# Patient Record
Sex: MALE | Race: WHITE | NOT HISPANIC OR LATINO | Employment: OTHER | URBAN - METROPOLITAN AREA
[De-identification: names, ages, dates, MRNs, and addresses within clinical notes are randomized per-mention and may not be internally consistent; named-entity substitution may affect disease eponyms.]

---

## 2019-01-10 ENCOUNTER — OFFICE VISIT - HEALTHEAST (OUTPATIENT)
Dept: CARDIOLOGY | Facility: CLINIC | Age: 74
End: 2019-01-10

## 2019-01-10 DIAGNOSIS — I25.10 CORONARY ARTERY DISEASE INVOLVING NATIVE CORONARY ARTERY OF NATIVE HEART WITHOUT ANGINA PECTORIS: ICD-10-CM

## 2019-01-10 ASSESSMENT — MIFFLIN-ST. JEOR: SCORE: 1642.77

## 2019-05-20 ENCOUNTER — OFFICE VISIT - HEALTHEAST (OUTPATIENT)
Dept: FAMILY MEDICINE | Facility: CLINIC | Age: 74
End: 2019-05-20

## 2019-05-20 DIAGNOSIS — S61.551A DOG BITE OF RIGHT WRIST WITH INFECTION, INITIAL ENCOUNTER: ICD-10-CM

## 2019-05-20 DIAGNOSIS — L08.9 DOG BITE OF RIGHT WRIST WITH INFECTION, INITIAL ENCOUNTER: ICD-10-CM
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
## 2019-05-23 ENCOUNTER — OFFICE VISIT - HEALTHEAST (OUTPATIENT)
Dept: INTERNAL MEDICINE | Facility: CLINIC | Age: 74
End: 2019-05-23

## 2019-05-23 DIAGNOSIS — J43.9 PULMONARY EMPHYSEMA, UNSPECIFIED EMPHYSEMA TYPE (H): ICD-10-CM

## 2019-05-23 DIAGNOSIS — Z86.002 HX OF CARCINOMA IN SITU OF PROSTATE: ICD-10-CM
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
## 2019-05-23 DIAGNOSIS — I25.10 CORONARY ARTERY DISEASE DUE TO CALCIFIED CORONARY LESION: ICD-10-CM

## 2019-05-23 DIAGNOSIS — I10 BENIGN ESSENTIAL HYPERTENSION: ICD-10-CM

## 2019-05-23 DIAGNOSIS — I25.84 CORONARY ARTERY DISEASE DUE TO CALCIFIED CORONARY LESION: ICD-10-CM

## 2019-05-23 DIAGNOSIS — F41.9 ANXIETY: ICD-10-CM

## 2019-05-23 ASSESSMENT — MIFFLIN-ST. JEOR: SCORE: 1606.49

## 2019-05-30 ENCOUNTER — AMBULATORY - HEALTHEAST (OUTPATIENT)
Dept: LAB | Facility: CLINIC | Age: 74
End: 2019-05-30

## 2019-05-30 DIAGNOSIS — Z86.002 HX OF CARCINOMA IN SITU OF PROSTATE: ICD-10-CM

## 2019-05-30 DIAGNOSIS — I10 BENIGN ESSENTIAL HYPERTENSION: ICD-10-CM

## 2019-05-30 DIAGNOSIS — I25.84 CORONARY ARTERY DISEASE DUE TO CALCIFIED CORONARY LESION: ICD-10-CM

## 2019-05-30 DIAGNOSIS — I25.10 CORONARY ARTERY DISEASE DUE TO CALCIFIED CORONARY LESION: ICD-10-CM

## 2019-05-30 LAB
ANION GAP SERPL CALCULATED.3IONS-SCNC: 9 MMOL/L (ref 5–18)
BUN SERPL-MCNC: 13 MG/DL (ref 8–28)
CALCIUM SERPL-MCNC: 9.4 MG/DL (ref 8.5–10.5)
CHLORIDE BLD-SCNC: 104 MMOL/L (ref 98–107)
CHOLEST SERPL-MCNC: 155 MG/DL
CO2 SERPL-SCNC: 25 MMOL/L (ref 22–31)
CREAT SERPL-MCNC: 0.83 MG/DL (ref 0.7–1.3)
ERYTHROCYTE [DISTWIDTH] IN BLOOD BY AUTOMATED COUNT: 12 % (ref 11–14.5)
FASTING STATUS PATIENT QL REPORTED: YES
GFR SERPL CREATININE-BSD FRML MDRD: >60 ML/MIN/1.73M2
GLUCOSE BLD-MCNC: 95 MG/DL (ref 70–125)
HCT VFR BLD AUTO: 49.4 % (ref 40–54)
HDLC SERPL-MCNC: 50 MG/DL
HGB BLD-MCNC: 17.1 G/DL (ref 14–18)
LDLC SERPL CALC-MCNC: 92 MG/DL
MCH RBC QN AUTO: 32.4 PG (ref 27–34)
MCHC RBC AUTO-ENTMCNC: 34.6 G/DL (ref 32–36)
MCV RBC AUTO: 94 FL (ref 80–100)
PLATELET # BLD AUTO: 283 THOU/UL (ref 140–440)
PMV BLD AUTO: 9.2 FL (ref 8.5–12.5)
POTASSIUM BLD-SCNC: 4.6 MMOL/L (ref 3.5–5)
PSA SERPL-MCNC: <0.1 NG/ML (ref 0–6.5)
RBC # BLD AUTO: 5.27 MILL/UL (ref 4.4–6.2)
SODIUM SERPL-SCNC: 138 MMOL/L (ref 136–145)
TRIGL SERPL-MCNC: 67 MG/DL
TSH SERPL DL<=0.005 MIU/L-ACNC: 1.33 UIU/ML (ref 0.3–5)
WBC: 7.2 THOU/UL (ref 4–11)

## 2019-06-07 ENCOUNTER — COMMUNICATION - HEALTHEAST (OUTPATIENT)
Dept: INTERNAL MEDICINE | Facility: CLINIC | Age: 74
End: 2019-06-07

## 2019-06-14 ENCOUNTER — COMMUNICATION - HEALTHEAST (OUTPATIENT)
Dept: INTERNAL MEDICINE | Facility: CLINIC | Age: 74
End: 2019-06-14

## 2019-06-14 DIAGNOSIS — J43.9 PULMONARY EMPHYSEMA, UNSPECIFIED EMPHYSEMA TYPE (H): ICD-10-CM

## 2019-10-03 ENCOUNTER — HEALTH MAINTENANCE LETTER (OUTPATIENT)
Age: 74
End: 2019-10-03

## 2019-10-28 ENCOUNTER — OFFICE VISIT (OUTPATIENT)
Dept: FAMILY MEDICINE | Facility: CLINIC | Age: 74
End: 2019-10-28
Payer: COMMERCIAL

## 2019-10-28 VITALS
RESPIRATION RATE: 24 BRPM | OXYGEN SATURATION: 93 % | WEIGHT: 186 LBS | HEIGHT: 73 IN | TEMPERATURE: 98.3 F | SYSTOLIC BLOOD PRESSURE: 118 MMHG | BODY MASS INDEX: 24.65 KG/M2 | HEART RATE: 90 BPM | DIASTOLIC BLOOD PRESSURE: 70 MMHG

## 2019-10-28 DIAGNOSIS — Z12.11 SPECIAL SCREENING FOR MALIGNANT NEOPLASMS, COLON: ICD-10-CM

## 2019-10-28 DIAGNOSIS — H61.23 CERUMINOSIS, BILATERAL: ICD-10-CM

## 2019-10-28 DIAGNOSIS — Z00.00 ENCOUNTER FOR MEDICARE ANNUAL WELLNESS EXAM: Primary | ICD-10-CM

## 2019-10-28 PROCEDURE — 99387 INIT PM E/M NEW PAT 65+ YRS: CPT | Performed by: FAMILY MEDICINE

## 2019-10-28 PROCEDURE — 99212 OFFICE O/P EST SF 10 MIN: CPT | Mod: 25 | Performed by: FAMILY MEDICINE

## 2019-10-28 RX ORDER — ATORVASTATIN CALCIUM 40 MG/1
40 TABLET, FILM COATED ORAL
COMMUNITY
Start: 2018-12-14 | End: 2019-10-28

## 2019-10-28 RX ORDER — MIRTAZAPINE 30 MG/1
30 TABLET, FILM COATED ORAL
COMMUNITY
Start: 2018-12-12 | End: 2021-05-24

## 2019-10-28 RX ORDER — AMLODIPINE BESYLATE 5 MG/1
5 TABLET ORAL
COMMUNITY
Start: 2018-12-21

## 2019-10-28 RX ORDER — ASPIRIN 81 MG/1
81 TABLET ORAL DAILY
COMMUNITY
Start: 2019-01-10

## 2019-10-28 RX ORDER — ALPRAZOLAM 0.5 MG
TABLET ORAL
COMMUNITY
Start: 2019-05-23 | End: 2020-05-11

## 2019-10-28 RX ORDER — ALBUTEROL SULFATE 90 UG/1
1-2 AEROSOL, METERED RESPIRATORY (INHALATION)
COMMUNITY
Start: 2018-03-15

## 2019-10-28 RX ORDER — BUDESONIDE AND FORMOTEROL FUMARATE DIHYDRATE 80; 4.5 UG/1; UG/1
1 AEROSOL RESPIRATORY (INHALATION)
COMMUNITY
Start: 2018-03-15

## 2019-10-28 ASSESSMENT — ENCOUNTER SYMPTOMS
DIARRHEA: 0
CHILLS: 0
EYE PAIN: 0
DIZZINESS: 0
NERVOUS/ANXIOUS: 1
COUGH: 1
CONSTIPATION: 0
HEMATURIA: 0
ABDOMINAL PAIN: 0
HEMATOCHEZIA: 0
FEVER: 0

## 2019-10-28 ASSESSMENT — MIFFLIN-ST. JEOR: SCORE: 1642.57

## 2019-10-28 ASSESSMENT — ACTIVITIES OF DAILY LIVING (ADL): CURRENT_FUNCTION: NO ASSISTANCE NEEDED

## 2019-10-28 NOTE — PROGRESS NOTES
"SUBJECTIVE:   Omar Adams is a 74 year old male who presents for Preventive Visit.  Are you in the first 12 months of your Medicare coverage?  No    Healthy Habits:     In general, how would you rate your overall health?  Good    Frequency of exercise:  2-3 days/week    Duration of exercise:  30-45 minutes    Do you usually eat at least 4 servings of fruit and vegetables a day, include whole grains    & fiber and avoid regularly eating high fat or \"junk\" foods?  Yes    Taking medications regularly:  Yes    Medication side effects:  None    Ability to successfully perform activities of daily living:  No assistance needed    Home Safety:  No safety concerns identified    Hearing Impairment:  Difficulty following a conversation in a noisy restaurant or crowded room, need to ask people to speak up or repeat themselves and difficulty understanding soft or whispered speech    In the past 6 months, have you been bothered by leaking of urine?  No    In general, how would you rate your overall mental or emotional health?  Good      PHQ-2 Total Score: 2    Additional concerns today:  No    Current Chronic Medical Conditions  Hyperlipidemia- went off meds last year  HTN  COPD   RLS  Chronic low back pain and hx of fractures    Social History  Lives alone.  .  Three adult kids: 38 son, 35 daughter, 39 son.  5 grand kids.  Retired  from [employer] since 2015.  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 November- April.     HCM  Over due for colonoscopy.    Additional Concerns  Recent COPD exacerbation- s/p recent prednisone for flare.  Ears are clogged with wax B    Do you feel safe in your environment? Yes    Do you have a Health Care Directive? No: Advance care planning reviewed with patient; information given to patient to review.      Fall risk  Fallen 2 or more times in the past year?: No  Any fall with injury in the past year?: No    Cognitive " Screening   1) Repeat 3 items (Leader, Season, Table)    2) Clock draw: NORMAL  3) 3 item recall: Recalls 1 object   Results: NORMAL clock, 1-2 items recalled: COGNITIVE IMPAIRMENT LESS LIKELY    Mini-CogTM Copyright ROBERTO Myers. Licensed by the author for use in [facility]; reprinted with permission (yadira@[facility]). All rights reserved.      Do you have sleep apnea, excessive snoring or daytime drowsiness?: no    Reviewed and updated as needed this visit by clinical staff  Tobacco  Allergies  Meds  Med Hx  Surg Hx  Fam Hx  Soc Hx        Reviewed and updated as needed this visit by Provider        Social History     Tobacco Use     Smoking status: Current Some Day Smoker     Packs/day: 0.00     Types: Cigarettes     Smokeless tobacco: Never Used   Substance Use Topics     Alcohol use: Not Currently     If you drink alcohol do you typically have >3 drinks per day or >7 drinks per week? No    Alcohol Use 10/28/2019   Prescreen: >3 drinks/day or >7 drinks/week? No       Current providers sharing in care for this patient include: Patient Care Team:  Syeda Puga MD as PCP - General (Family Practice)    The following health maintenance items are reviewed in Epic and correct as of today:  Health Maintenance   Topic Date Due     HEPATITIS C SCREENING  1945     ADVANCE CARE PLANNING  1945     COLONOSCOPY  06/01/1956     LIPID  06/01/1981     ZOSTER IMMUNIZATION (1 of 2) 06/01/1996     MEDICARE ANNUAL WELLNESS VISIT  06/01/2011     FALL RISK ASSESSMENT  06/01/2011     AORTIC ANEURYSM SCREENING (SYSTEM ASSIGNED)  06/01/2011     PHQ-2  01/01/2019     INFLUENZA VACCINE (1) 09/01/2019     DTAP/TDAP/TD IMMUNIZATION (3 - Td) 05/20/2029     PNEUMOCOCCAL IMMUNIZATION 65+ LOW/MEDIUM RISK  Completed     IPV IMMUNIZATION  Aged Out     MENINGITIS IMMUNIZATION  Aged Out     BP Readings from Last 3 Encounters:   10/28/19 118/70    Wt Readings from Last 3 Encounters:   10/28/19 84.4 kg (186  "lb)                  There is no problem list on file for this patient.    History reviewed. No pertinent surgical history.    Social History     Tobacco Use     Smoking status: Current Some Day Smoker     Packs/day: 0.00     Types: Cigarettes     Smokeless tobacco: Never Used   Substance Use Topics     Alcohol use: Not Currently     History reviewed. No pertinent family history.      Current Outpatient Medications   Medication Sig Dispense Refill     albuterol (PROAIR HFA/PROVENTIL HFA/VENTOLIN HFA) 108 (90 Base) MCG/ACT inhaler Inhale 1-2 puffs into the lungs       ALPRAZolam (XANAX) 0.5 MG tablet TAKE ONE TABLET BY MOUTH AT BEDTIME AS NEEDED       amLODIPine (NORVASC) 5 MG tablet Take 5 mg by mouth       aspirin 81 MG EC tablet Take 81 mg by mouth daily       budesonide-formoterol (SYMBICORT) 80-4.5 MCG/ACT Inhaler Inhale 1 puff into the lungs       medical cannabis (Patient's own supply) every 24 hours       mirtazapine (REMERON) 30 MG tablet Take 30 mg by mouth       No Known Allergies  No lab results found.       Review of Systems   Constitutional: Negative for chills and fever.   HENT: Positive for congestion. Negative for ear pain.    Eyes: Negative for pain.   Respiratory: Positive for cough.    Cardiovascular: Negative for chest pain.   Gastrointestinal: Negative for abdominal pain, constipation, diarrhea and hematochezia.   Genitourinary: Negative for hematuria.   Neurological: Negative for dizziness.   Psychiatric/Behavioral: The patient is nervous/anxious.          OBJECTIVE:   /70   Pulse 90   Temp 98.3  F (36.8  C) (Oral)   Resp 24   Ht 1.854 m (6' 1\")   Wt 84.4 kg (186 lb)   SpO2 93%   BMI 24.54 kg/m   Estimated body mass index is 24.54 kg/m  as calculated from the following:    Height as of this encounter: 1.854 m (6' 1\").    Weight as of this encounter: 84.4 kg (186 lb).  Physical Exam  GENERAL: healthy, alert and no distress  EYES: Eyes grossly normal to inspection, PERRL and " "conjunctivae and sclerae normal  HENT: B cerumenosis unable to remove with curettage, nose and mouth without ulcers or lesions  NECK: no adenopathy, no asymmetry, masses, or scars and thyroid normal to palpation  RESP: lungs clear to auscultation - no rales, rhonchi or wheezes  CV: regular rate and rhythm, normal S1 S2, no S3 or S4, no murmur, click or rub, no peripheral edema and peripheral pulses strong  ABDOMEN: soft, nontender, no hepatosplenomegaly, no masses and bowel sounds normal  MS: no gross musculoskeletal defects noted, no edema  SKIN: no suspicious lesions or rashes  NEURO: Normal strength and tone, mentation intact and speech normal  PSYCH: mentation appears normal, affect normal/bright    ASSESSMENT / PLAN:   1. Encounter for Medicare annual wellness exam    - GASTROENTEROLOGY ADULT REF PROCEDURE ONLY None    Labs recently drawn 5-2019-- will repeat next May.  Continue good diet and exercise.    2. Special screening for malignant neoplasms, colon    - GASTROENTEROLOGY ADULT REF PROCEDURE ONLY None    Referred to [facility] for colonoscopy- patient to call to schedule.    3. Ceruminosis, bilateral    - REMOVE IMPACTED CERUMEN    Resolved s/p B lavage.    End of Life Planning:  Patient currently has an advanced directive: No.  I have verified the patient's ablity to prepare an advanced directive/make health care decisions.  Literature was provided to assist patient in preparing an advanced directive.    COUNSELING:  Reviewed preventive health counseling, as reflected in patient instructions       Regular exercise       Healthy diet/nutrition       Colon cancer screening    Estimated body mass index is 24.54 kg/m  as calculated from the following:    Height as of this encounter: 1.854 m (6' 1\").    Weight as of this encounter: 84.4 kg (186 lb).         reports that he has been smoking cigarettes. He has been smoking about 0.00 packs per day. He has never used smokeless tobacco.  Tobacco Cessation Action Plan: " Information offered: Patient not interested at this time    Appropriate preventive services were discussed with this patient, including applicable screening as appropriate for cardiovascular disease, diabetes, osteopenia/osteoporosis, and glaucoma.  As appropriate for age/gender, discussed screening for colorectal cancer, prostate cancer, breast cancer, and cervical cancer. Checklist reviewing preventive services available has been given to the patient.    Reviewed patients plan of care and provided an AVS. The Basic Care Plan (routine screening as documented in Health Maintenance) for Omar meets the Care Plan requirement. This Care Plan has been established and reviewed with the Patient.    Counseling Resources:  ATP IV Guidelines  Pooled Cohorts Equation Calculator  Breast Cancer Risk Calculator  FRAX Risk Assessment  ICSI Preventive Guidelines  Dietary Guidelines for Americans, 2010  USDA's MyPlate  ASA Prophylaxis  Lung CA Screening    Syeda Puga MD  [facility]    Identified Health Risks:

## 2019-10-28 NOTE — PATIENT INSTRUCTIONS
Patient Education   Personalized Prevention Plan  You are due for the preventive services outlined below.  Your care team is available to assist you in scheduling these services.  If you have already completed any of these items, please share that information with your care team to update in your medical record.  Health Maintenance Due   Topic Date Due     Hepatitis C Screening  1945     Discuss Advance Care Planning  1945     Colonoscopy  06/01/1956     Cholesterol Lab  06/01/1981     Zoster (Shingles) Vaccine (1 of 2) 06/01/1996     Annual Wellness Visit  06/01/2011     FALL RISK ASSESSMENT  06/01/2011     AORTIC ANEURYSM SCREENING (SYSTEM ASSIGNED)  06/01/2011     PHQ-2  01/01/2019     Flu Vaccine (1) 09/01/2019

## 2019-12-24 ENCOUNTER — TRANSFERRED RECORDS (OUTPATIENT)
Dept: HEALTH INFORMATION MANAGEMENT | Facility: CLINIC | Age: 74
End: 2019-12-24

## 2019-12-24 ENCOUNTER — RECORDS - HEALTHEAST (OUTPATIENT)
Dept: ADMINISTRATIVE | Facility: OTHER | Age: 74
End: 2019-12-24

## 2020-03-16 ENCOUNTER — E-VISIT (OUTPATIENT)
Dept: FAMILY MEDICINE | Facility: CLINIC | Age: 75
End: 2020-03-16

## 2020-03-16 ENCOUNTER — TELEPHONE (OUTPATIENT)
Dept: FAMILY MEDICINE | Facility: CLINIC | Age: 75
End: 2020-03-16

## 2020-03-16 DIAGNOSIS — G89.29 CHRONIC LOW BACK PAIN WITH SCIATICA, SCIATICA LATERALITY UNSPECIFIED, UNSPECIFIED BACK PAIN LATERALITY: Primary | ICD-10-CM

## 2020-03-16 DIAGNOSIS — M54.40 CHRONIC LOW BACK PAIN WITH SCIATICA, SCIATICA LATERALITY UNSPECIFIED, UNSPECIFIED BACK PAIN LATERALITY: Primary | ICD-10-CM

## 2020-03-16 PROCEDURE — 99421 OL DIG E/M SVC 5-10 MIN: CPT | Performed by: FAMILY MEDICINE

## 2020-03-16 RX ORDER — OXYCODONE AND ACETAMINOPHEN 5; 325 MG/1; MG/1
1 TABLET ORAL EVERY 6 HOURS PRN
Qty: 10 TABLET | Refills: 0 | Status: SHIPPED | OUTPATIENT
Start: 2020-03-16 | End: 2020-03-19

## 2020-03-16 NOTE — TELEPHONE ENCOUNTER
"S-(situation): Pt arrived from [facility] one week ago.Sciatica developed while he was down there.  His neighbor in [facility], who is a [place] doctor ,gave him  6-8 injections every other day, and the sciatica  pain went away. Pain from hip to knee then and only felt it when he was laying down to sleep.     Now he thinks he bruised his upper rib on the left side from sleeping on that side as he was avoiding sleeping on the other side. He was only sleeping on that one side for days.His main concern is a  \"bruised rib.\"    He also states the air quality was bad in [facility] and had COPD exacerbation which his improved.  In the past he had pleurisy and worried he could get this.     He is asking for pain medication to get through the night due to rib pain.     Cough is resolving  but when he does cough it is productive. The coughing was making the rib worse but no pain now with cough just with laying on side or touching area.    He is taking ibuprofen and tylenol for this rib pain with no help    B-(background): no injury    Ok for E visit? Or phone visit?    Sheela Proctor, RN, BSN       "

## 2020-05-08 ENCOUNTER — E-VISIT (OUTPATIENT)
Dept: FAMILY MEDICINE | Facility: CLINIC | Age: 75
End: 2020-05-08

## 2020-05-08 DIAGNOSIS — F41.1 GAD (GENERALIZED ANXIETY DISORDER): Primary | ICD-10-CM

## 2020-05-08 PROCEDURE — 99207 ZZC NO BILLABLE SERVICE THIS VISIT: CPT | Performed by: FAMILY MEDICINE

## 2020-05-08 ASSESSMENT — ANXIETY QUESTIONNAIRES
GAD7 TOTAL SCORE: 4
6. BECOMING EASILY ANNOYED OR IRRITABLE: NOT AT ALL
7. FEELING AFRAID AS IF SOMETHING AWFUL MIGHT HAPPEN: NOT AT ALL
4. TROUBLE RELAXING: SEVERAL DAYS
1. FEELING NERVOUS, ANXIOUS, OR ON EDGE: SEVERAL DAYS
5. BEING SO RESTLESS THAT IT IS HARD TO SIT STILL: NOT AT ALL
3. WORRYING TOO MUCH ABOUT DIFFERENT THINGS: SEVERAL DAYS
GAD7 TOTAL SCORE: 4
2. NOT BEING ABLE TO STOP OR CONTROL WORRYING: SEVERAL DAYS
7. FEELING AFRAID AS IF SOMETHING AWFUL MIGHT HAPPEN: NOT AT ALL

## 2020-05-09 ASSESSMENT — ANXIETY QUESTIONNAIRES: GAD7 TOTAL SCORE: 4

## 2020-05-11 RX ORDER — ALPRAZOLAM 0.5 MG
0.5 TABLET ORAL
Qty: 30 TABLET | Refills: 2 | Status: SHIPPED | OUTPATIENT
Start: 2020-05-11

## 2020-07-24 ENCOUNTER — COMMUNICATION - HEALTHEAST (OUTPATIENT)
Dept: INTERNAL MEDICINE | Facility: CLINIC | Age: 75
End: 2020-07-24

## 2020-07-24 DIAGNOSIS — I10 BENIGN ESSENTIAL HYPERTENSION: ICD-10-CM

## 2020-09-28 ASSESSMENT — MIFFLIN-ST. JEOR: SCORE: 1662.96

## 2020-09-29 ENCOUNTER — COMMUNICATION - HEALTHEAST (OUTPATIENT)
Dept: SCHEDULING | Facility: CLINIC | Age: 75
End: 2020-09-29

## 2020-09-29 ENCOUNTER — ANESTHESIA - HEALTHEAST (OUTPATIENT)
Dept: SURGERY | Facility: CLINIC | Age: 75
End: 2020-09-29

## 2020-09-29 ENCOUNTER — SURGERY - HEALTHEAST (OUTPATIENT)
Dept: SURGERY | Facility: CLINIC | Age: 75
End: 2020-09-29

## 2020-09-30 ENCOUNTER — HOME CARE/HOSPICE - HEALTHEAST (OUTPATIENT)
Dept: HOME HEALTH SERVICES | Facility: HOME HEALTH | Age: 75
End: 2020-09-30

## 2020-09-30 ENCOUNTER — COMMUNICATION - HEALTHEAST (OUTPATIENT)
Dept: INTERNAL MEDICINE | Facility: CLINIC | Age: 75
End: 2020-09-30

## 2020-10-05 ENCOUNTER — RECORDS - HEALTHEAST (OUTPATIENT)
Dept: ADMINISTRATIVE | Facility: OTHER | Age: 75
End: 2020-10-05

## 2020-10-06 ENCOUNTER — RECORDS - HEALTHEAST (OUTPATIENT)
Dept: ADMINISTRATIVE | Facility: OTHER | Age: 75
End: 2020-10-06

## 2020-10-07 ENCOUNTER — HOSPITAL ENCOUNTER (OUTPATIENT)
Dept: ULTRASOUND IMAGING | Facility: CLINIC | Age: 75
Discharge: HOME | End: 2020-10-07
Attending: ORTHOPAEDIC SURGERY

## 2020-10-07 DIAGNOSIS — M79.602 PAIN AND SWELLING OF LEFT UPPER EXTREMITY: ICD-10-CM

## 2020-10-07 DIAGNOSIS — M79.89 PAIN AND SWELLING OF LEFT UPPER EXTREMITY: ICD-10-CM

## 2020-10-08 ENCOUNTER — COMMUNICATION - HEALTHEAST (OUTPATIENT)
Dept: INTERNAL MEDICINE | Facility: CLINIC | Age: 75
End: 2020-10-08

## 2020-10-08 ENCOUNTER — OFFICE VISIT - HEALTHEAST (OUTPATIENT)
Dept: INTERNAL MEDICINE | Facility: CLINIC | Age: 75
End: 2020-10-08

## 2020-10-08 ENCOUNTER — TELEPHONE (OUTPATIENT)
Dept: FAMILY MEDICINE | Facility: CLINIC | Age: 75
End: 2020-10-08

## 2020-10-08 DIAGNOSIS — S42.292A OTHER CLOSED DISPLACED FRACTURE OF PROXIMAL END OF LEFT HUMERUS, INITIAL ENCOUNTER: ICD-10-CM

## 2020-10-08 DIAGNOSIS — J44.9 CHRONIC OBSTRUCTIVE PULMONARY DISEASE, UNSPECIFIED COPD TYPE (H): ICD-10-CM

## 2020-10-08 DIAGNOSIS — F41.9 ANXIETY: ICD-10-CM

## 2020-10-08 ASSESSMENT — MIFFLIN-ST. JEOR: SCORE: 1666.58

## 2020-10-08 ASSESSMENT — PATIENT HEALTH QUESTIONNAIRE - PHQ9: SUM OF ALL RESPONSES TO PHQ QUESTIONS 1-9: 0

## 2020-10-08 NOTE — TELEPHONE ENCOUNTER
LM to [facility]-He does have an internal medicine appointment today. Juhi Lin RN, MD   Internal Medicine   NPI: [id number]   [address] [address]       Phone: [phone]   Fax: [phone]

## 2020-10-08 NOTE — TELEPHONE ENCOUNTER
Reason for Call:  Other call back    Detailed comments: Patient thought he had an appointment scheduled with PCP today. States he fell and broke his arm last week and was called by 'her nurse' about this and supposedly relayed to the patient that he had an appointment. Nothing scheduled. He is requesting a call back to discuss this / medication. Please follow up. Thanks!    Phone Number Patient can be reached at: Home number on file [phone] (home)    Best Time: Any    Can we leave a detailed message on this number? YES    Call taken on 10/8/2020 at 10:33 AM by Lindsay Castillo

## 2020-10-09 ENCOUNTER — RECORDS - HEALTHEAST (OUTPATIENT)
Dept: ADMINISTRATIVE | Facility: OTHER | Age: 75
End: 2020-10-09

## 2020-10-14 ENCOUNTER — COMMUNICATION - HEALTHEAST (OUTPATIENT)
Dept: INTERNAL MEDICINE | Facility: CLINIC | Age: 75
End: 2020-10-14

## 2020-10-16 ENCOUNTER — RECORDS - HEALTHEAST (OUTPATIENT)
Dept: ADMINISTRATIVE | Facility: OTHER | Age: 75
End: 2020-10-16

## 2020-10-22 ENCOUNTER — COMMUNICATION - HEALTHEAST (OUTPATIENT)
Dept: INTERNAL MEDICINE | Facility: CLINIC | Age: 75
End: 2020-10-22

## 2020-10-28 ENCOUNTER — RECORDS - HEALTHEAST (OUTPATIENT)
Dept: ADMINISTRATIVE | Facility: OTHER | Age: 75
End: 2020-10-28

## 2020-10-29 ENCOUNTER — RECORDS - HEALTHEAST (OUTPATIENT)
Dept: ADMINISTRATIVE | Facility: OTHER | Age: 75
End: 2020-10-29

## 2020-11-03 ENCOUNTER — RECORDS - HEALTHEAST (OUTPATIENT)
Dept: ADMINISTRATIVE | Facility: OTHER | Age: 75
End: 2020-11-03

## 2020-11-07 ENCOUNTER — HEALTH MAINTENANCE LETTER (OUTPATIENT)
Age: 75
End: 2020-11-07

## 2020-11-12 ENCOUNTER — RECORDS - HEALTHEAST (OUTPATIENT)
Dept: ADMINISTRATIVE | Facility: OTHER | Age: 75
End: 2020-11-12

## 2020-11-16 ENCOUNTER — OFFICE VISIT - HEALTHEAST (OUTPATIENT)
Dept: INTERNAL MEDICINE | Facility: CLINIC | Age: 75
End: 2020-11-16

## 2020-11-16 DIAGNOSIS — F41.9 ANXIETY: ICD-10-CM

## 2020-11-16 DIAGNOSIS — F33.1 MAJOR DEPRESSIVE DISORDER, RECURRENT EPISODE, MODERATE (H): ICD-10-CM

## 2020-11-16 DIAGNOSIS — Z00.00 WELLNESS EXAMINATION: ICD-10-CM

## 2020-11-16 DIAGNOSIS — J44.9 CHRONIC OBSTRUCTIVE PULMONARY DISEASE, UNSPECIFIED COPD TYPE (H): ICD-10-CM

## 2020-11-16 DIAGNOSIS — I25.84 CORONARY ARTERY DISEASE DUE TO CALCIFIED CORONARY LESION: ICD-10-CM

## 2020-11-16 DIAGNOSIS — R91.8 LUNG NODULES: ICD-10-CM

## 2020-11-16 DIAGNOSIS — I10 BENIGN ESSENTIAL HYPERTENSION: ICD-10-CM

## 2020-11-16 DIAGNOSIS — I25.10 CORONARY ARTERY DISEASE DUE TO CALCIFIED CORONARY LESION: ICD-10-CM

## 2020-11-16 DIAGNOSIS — C61 PROSTATE CANCER (H): ICD-10-CM

## 2020-11-16 DIAGNOSIS — Z11.59 NEED FOR HEPATITIS C SCREENING TEST: ICD-10-CM

## 2020-11-16 LAB
ALBUMIN SERPL-MCNC: 3.8 G/DL (ref 3.5–5)
ALP SERPL-CCNC: 90 U/L (ref 45–120)
ALT SERPL W P-5'-P-CCNC: 13 U/L (ref 0–45)
ANION GAP SERPL CALCULATED.3IONS-SCNC: 11 MMOL/L (ref 5–18)
AST SERPL W P-5'-P-CCNC: 15 U/L (ref 0–40)
BILIRUB SERPL-MCNC: 0.6 MG/DL (ref 0–1)
BUN SERPL-MCNC: 12 MG/DL (ref 8–28)
CALCIUM SERPL-MCNC: 9.5 MG/DL (ref 8.5–10.5)
CHLORIDE BLD-SCNC: 102 MMOL/L (ref 98–107)
CHOLEST SERPL-MCNC: 165 MG/DL
CO2 SERPL-SCNC: 26 MMOL/L (ref 22–31)
CREAT SERPL-MCNC: 1 MG/DL (ref 0.7–1.3)
ERYTHROCYTE [DISTWIDTH] IN BLOOD BY AUTOMATED COUNT: 11.1 % (ref 11–14.5)
FASTING STATUS PATIENT QL REPORTED: YES
GFR SERPL CREATININE-BSD FRML MDRD: >60 ML/MIN/1.73M2
GLUCOSE BLD-MCNC: 101 MG/DL (ref 70–125)
HCT VFR BLD AUTO: 46 % (ref 40–54)
HDLC SERPL-MCNC: 49 MG/DL
HGB BLD-MCNC: 15.5 G/DL (ref 14–18)
LDLC SERPL CALC-MCNC: 93 MG/DL
MCH RBC QN AUTO: 32.8 PG (ref 27–34)
MCHC RBC AUTO-ENTMCNC: 33.8 G/DL (ref 32–36)
MCV RBC AUTO: 97 FL (ref 80–100)
PLATELET # BLD AUTO: 375 THOU/UL (ref 140–440)
PMV BLD AUTO: 6.5 FL (ref 7–10)
POTASSIUM BLD-SCNC: 4.5 MMOL/L (ref 3.5–5)
PROT SERPL-MCNC: 7.1 G/DL (ref 6–8)
PSA SERPL-MCNC: <0.1 NG/ML (ref 0–6.5)
RBC # BLD AUTO: 4.74 MILL/UL (ref 4.4–6.2)
SODIUM SERPL-SCNC: 139 MMOL/L (ref 136–145)
TRIGL SERPL-MCNC: 115 MG/DL
WBC: 5 THOU/UL (ref 4–11)

## 2020-11-16 ASSESSMENT — PATIENT HEALTH QUESTIONNAIRE - PHQ9: SUM OF ALL RESPONSES TO PHQ QUESTIONS 1-9: 10

## 2020-11-16 ASSESSMENT — MIFFLIN-ST. JEOR: SCORE: 1594.01

## 2020-11-17 LAB — HCV AB SERPL QL IA: NEGATIVE

## 2020-11-23 ENCOUNTER — HOSPITAL ENCOUNTER (OUTPATIENT)
Dept: CT IMAGING | Facility: CLINIC | Age: 75
Discharge: HOME | End: 2020-11-23

## 2020-11-23 DIAGNOSIS — R91.8 LUNG NODULES: ICD-10-CM

## 2020-11-23 DIAGNOSIS — J44.9 CHRONIC OBSTRUCTIVE PULMONARY DISEASE, UNSPECIFIED COPD TYPE (H): ICD-10-CM

## 2020-12-02 ENCOUNTER — RECORDS - HEALTHEAST (OUTPATIENT)
Dept: ADMINISTRATIVE | Facility: OTHER | Age: 75
End: 2020-12-02

## 2020-12-07 ENCOUNTER — MYC MEDICAL ADVICE (OUTPATIENT)
Dept: FAMILY MEDICINE | Facility: CLINIC | Age: 75
End: 2020-12-07

## 2021-01-15 ENCOUNTER — HEALTH MAINTENANCE LETTER (OUTPATIENT)
Age: 76
End: 2021-01-15

## 2021-01-15 ENCOUNTER — COMMUNICATION - HEALTHEAST (OUTPATIENT)
Dept: INTERNAL MEDICINE | Facility: CLINIC | Age: 76
End: 2021-01-15

## 2021-02-10 ENCOUNTER — MYC MEDICAL ADVICE (OUTPATIENT)
Dept: FAMILY MEDICINE | Facility: CLINIC | Age: 76
End: 2021-02-10

## 2021-02-11 NOTE — TELEPHONE ENCOUNTER
Please advise he [facility] lottery for 65 and over and to contact local pharmacies now distributing as of today??

## 2021-02-11 NOTE — TELEPHONE ENCOUNTER
Writer responded via Arthena.  BRADLEY RayN, RN  [facility]

## 2021-03-03 ENCOUNTER — IMMUNIZATION (OUTPATIENT)
Dept: NURSING | Facility: CLINIC | Age: 76
End: 2021-03-03
Payer: COMMERCIAL

## 2021-03-03 PROCEDURE — 0001A PR COVID VAC PFIZER DIL RECON 30 MCG/0.3 ML IM: CPT

## 2021-03-03 PROCEDURE — 91300 PR COVID VAC PFIZER DIL RECON 30 MCG/0.3 ML IM: CPT

## 2021-03-09 ENCOUNTER — RECORDS - HEALTHEAST (OUTPATIENT)
Dept: ADMINISTRATIVE | Facility: OTHER | Age: 76
End: 2021-03-09

## 2021-03-10 ENCOUNTER — RECORDS - HEALTHEAST (OUTPATIENT)
Dept: ADMINISTRATIVE | Facility: OTHER | Age: 76
End: 2021-03-10

## 2021-03-24 ENCOUNTER — IMMUNIZATION (OUTPATIENT)
Dept: NURSING | Facility: CLINIC | Age: 76
End: 2021-03-24
Attending: INTERNAL MEDICINE
Payer: COMMERCIAL

## 2021-03-24 PROCEDURE — 0002A PR COVID VAC PFIZER DIL RECON 30 MCG/0.3 ML IM: CPT

## 2021-03-24 PROCEDURE — 91300 PR COVID VAC PFIZER DIL RECON 30 MCG/0.3 ML IM: CPT

## 2021-04-09 ENCOUNTER — RECORDS - HEALTHEAST (OUTPATIENT)
Dept: SCHEDULING | Facility: CLINIC | Age: 76
End: 2021-04-09

## 2021-04-09 ENCOUNTER — RECORDS - HEALTHEAST (OUTPATIENT)
Dept: ADMINISTRATIVE | Facility: OTHER | Age: 76
End: 2021-04-09

## 2021-04-09 DIAGNOSIS — S42.209A PROXIMAL HUMERUS FRACTURE: ICD-10-CM

## 2021-05-24 ENCOUNTER — OFFICE VISIT (OUTPATIENT)
Dept: URGENT CARE | Facility: URGENT CARE | Age: 76
End: 2021-05-24
Payer: COMMERCIAL

## 2021-05-24 VITALS
BODY MASS INDEX: 23.22 KG/M2 | OXYGEN SATURATION: 97 % | TEMPERATURE: 98 F | DIASTOLIC BLOOD PRESSURE: 95 MMHG | SYSTOLIC BLOOD PRESSURE: 145 MMHG | WEIGHT: 176 LBS | HEART RATE: 91 BPM

## 2021-05-24 DIAGNOSIS — R19.5 LOOSE STOOLS: Primary | ICD-10-CM

## 2021-05-24 LAB
ANION GAP SERPL CALCULATED.3IONS-SCNC: 7 MMOL/L (ref 3–14)
BASOPHILS # BLD AUTO: 0 10E9/L (ref 0–0.2)
BASOPHILS NFR BLD AUTO: 0.3 %
BUN SERPL-MCNC: 16 MG/DL (ref 7–30)
CALCIUM SERPL-MCNC: 9.2 MG/DL (ref 8.5–10.1)
CHLORIDE SERPL-SCNC: 100 MMOL/L (ref 94–109)
CO2 SERPL-SCNC: 29 MMOL/L (ref 20–32)
CREAT SERPL-MCNC: 0.83 MG/DL (ref 0.66–1.25)
DIFFERENTIAL METHOD BLD: ABNORMAL
EOSINOPHIL # BLD AUTO: 0.1 10E9/L (ref 0–0.7)
EOSINOPHIL NFR BLD AUTO: 1.2 %
ERYTHROCYTE [DISTWIDTH] IN BLOOD BY AUTOMATED COUNT: 11.9 % (ref 10–15)
GFR SERPL CREATININE-BSD FRML MDRD: 86 ML/MIN/{1.73_M2}
GLUCOSE SERPL-MCNC: 93 MG/DL (ref 70–99)
HCT VFR BLD AUTO: 48.6 % (ref 40–53)
HGB BLD-MCNC: 16.7 G/DL (ref 13.3–17.7)
LYMPHOCYTES # BLD AUTO: 1.5 10E9/L (ref 0.8–5.3)
LYMPHOCYTES NFR BLD AUTO: 19.8 %
MCH RBC QN AUTO: 33.4 PG (ref 26.5–33)
MCHC RBC AUTO-ENTMCNC: 34.4 G/DL (ref 31.5–36.5)
MCV RBC AUTO: 97 FL (ref 78–100)
MONOCYTES # BLD AUTO: 0.8 10E9/L (ref 0–1.3)
MONOCYTES NFR BLD AUTO: 9.7 %
NEUTROPHILS # BLD AUTO: 5.4 10E9/L (ref 1.6–8.3)
NEUTROPHILS NFR BLD AUTO: 69 %
PLATELET # BLD AUTO: 316 10E9/L (ref 150–450)
POTASSIUM SERPL-SCNC: 3.3 MMOL/L (ref 3.4–5.3)
RBC # BLD AUTO: 5 10E12/L (ref 4.4–5.9)
SODIUM SERPL-SCNC: 136 MMOL/L (ref 133–144)
WBC # BLD AUTO: 7.8 10E9/L (ref 4–11)

## 2021-05-24 PROCEDURE — 36415 COLL VENOUS BLD VENIPUNCTURE: CPT | Performed by: FAMILY MEDICINE

## 2021-05-24 PROCEDURE — 80048 BASIC METABOLIC PNL TOTAL CA: CPT | Performed by: FAMILY MEDICINE

## 2021-05-24 PROCEDURE — 99214 OFFICE O/P EST MOD 30 MIN: CPT | Performed by: FAMILY MEDICINE

## 2021-05-24 PROCEDURE — 85025 COMPLETE CBC W/AUTO DIFF WBC: CPT | Performed by: FAMILY MEDICINE

## 2021-05-24 NOTE — PATIENT INSTRUCTIONS
Try to keep hydrated drink  ounces of water/energy drinks a day      We will call you with the results once they return      For loose watery stools try the loperamide      If your symptoms worsen return to be evaluated    --    Dermatology appointment for your skin lesions  -- other option is [facility] in the community

## 2021-05-24 NOTE — PROGRESS NOTES
Assessment & Plan     Loose stools  Stool testing indicated at this time.   If no identifiable organisms present and symptoms persist consider C. Diff and giardia screening. Does not appear dehydrated. PMH negative for IBS/IBD. CBC wnl, BMP pending.   - Basic metabolic panel  (Ca, Cl, CO2, Creat, Gluc, K, Na, BUN)  - CBC with platelets and differential  - Enteric Bacteria and Virus Panel by MIKE Faria MD   [facility] [facility]    Elida New is a 75 year old male who presents to clinic today for the following health issues:  Chief Complaint   Patient presents with     Urgent Care     Diarrhea     diarrhea 5-6 timnes a day for 1 month.      HPI    1)  He has had weight loss over this period.   Denies fevers     No hx of immune disorders  Denies blood in stool. No reports of stools being 'foul smelling'      Denies lightheadedness     Denies recent use of antibiotics    Took a dose of lomotil today and has been taking about 3 times a week.     Hx of IBS  Patient was in [facility] for 3.5 months and returned 12 days ago      There are no active problems to display for this patient.      Current Outpatient Medications   Medication     albuterol (PROAIR HFA/PROVENTIL HFA/VENTOLIN HFA) 108 (90 Base) MCG/ACT inhaler     amLODIPine (NORVASC) 5 MG tablet     aspirin 81 MG EC tablet     budesonide-formoterol (SYMBICORT) 80-4.5 MCG/ACT Inhaler     medical cannabis (Patient's own supply)     mirtazapine (REMERON) 30 MG tablet     ALPRAZolam (XANAX) 0.5 MG tablet     No current facility-administered medications for this visit.          Objective    BP (!) 145/95   Pulse 91   Temp 98  F (36.7  C) (Tympanic)   Wt 79.8 kg (176 lb)   SpO2 97%   BMI 23.22 kg/m    Physical Exam   ABD: no guarding, normoactive    Results for orders placed or performed in visit on 05/24/21   Basic metabolic panel  (Ca, Cl, CO2, Creat, Gluc, K, Na, BUN)     Status: Abnormal   Result Value Ref Range    Sodium 136 133  - 144 mmol/L    Potassium 3.3 (L) 3.4 - 5.3 mmol/L    Chloride 100 94 - 109 mmol/L    Carbon Dioxide 29 20 - 32 mmol/L    Anion Gap 7 3 - 14 mmol/L    Glucose 93 70 - 99 mg/dL    Urea Nitrogen 16 7 - 30 mg/dL    Creatinine 0.83 0.66 - 1.25 mg/dL    GFR Estimate 86 >60 mL/min/[1.73_m2]    GFR Estimate If Black >90 >60 mL/min/[1.73_m2]    Calcium 9.2 8.5 - 10.1 mg/dL   CBC with platelets and differential     Status: Abnormal   Result Value Ref Range    WBC 7.8 4.0 - 11.0 10e9/L    RBC Count 5.00 4.4 - 5.9 10e12/L    Hemoglobin 16.7 13.3 - 17.7 g/dL    Hematocrit 48.6 40.0 - 53.0 %    MCV 97 78 - 100 fl    MCH 33.4 (H) 26.5 - 33.0 pg    MCHC 34.4 31.5 - 36.5 g/dL    RDW 11.9 10.0 - 15.0 %    Platelet Count 316 150 - 450 10e9/L    % Neutrophils 69.0 %    % Lymphocytes 19.8 %    % Monocytes 9.7 %    % Eosinophils 1.2 %    % Basophils 0.3 %    Absolute Neutrophil 5.4 1.6 - 8.3 10e9/L    Absolute Lymphocytes 1.5 0.8 - 5.3 10e9/L    Absolute Monocytes 0.8 0.0 - 1.3 10e9/L    Absolute Eosinophils 0.1 0.0 - 0.7 10e9/L    Absolute Basophils 0.0 0.0 - 0.2 10e9/L    Diff Method Automated Method                  The use of Dragon/ADTZation services may have been used to construct the content in this note; any grammatical or spelling errors are non-intentional. Please contact the author of this note directly if you are in need of any clarification.

## 2021-05-27 ASSESSMENT — PATIENT HEALTH QUESTIONNAIRE - PHQ9
SUM OF ALL RESPONSES TO PHQ QUESTIONS 1-9: 0
SUM OF ALL RESPONSES TO PHQ QUESTIONS 1-9: 10

## 2021-05-29 NOTE — PROGRESS NOTES
Office Visit   Omar Adams   73 y.o. male    Date of Visit: 5/23/2019    Chief Complaint   Patient presents with     Follow-up     Dog bite        Assessment and Plan   1. Coronary artery disease due to calcified coronary lesion  He has known coronary artery disease due to elevated calcium score.  He saw cardiology in January.  He is on atorvastatin and amlodipine.  He does continue to smoke and is not interested in smoking cessation at this time.  If he takes a daily aspirin.  He has not had any symptoms of chest pain, palpitations, or dyspnea on exertion.  I will get blood work with a fasting lipid panel, CBC, and metabolic panel.  I will get back to him with his test results.  - atorvastatin (LIPITOR) 40 MG tablet; Take 1 tablet (40 mg total) by mouth daily.  Dispense: 90 tablet; Refill: 3  - Lipid Cascade; Future  - HM2(CBC w/o Differential); Future  - Basic Metabolic Panel; Future  - Thyroid Cascade; Future    2. Benign essential hypertension  Blood pressure is excellent with a low-dose of amlodipine.  We will check blood work as noted above.  - amLODIPine (NORVASC) 5 MG tablet; Take 1 tablet (5 mg total) by mouth daily.  Dispense: 90 tablet; Refill: 3  - Lipid Cascade; Future  - HM2(CBC w/o Differential); Future  - Basic Metabolic Panel; Future  - Thyroid Cascade; Future    3. Pulmonary emphysema, unspecified emphysema type (H)  His COPD has been stable.  He continues to smoke.  I did refill his inhalers today.  - budesonide-formoterol (SYMBICORT) 80-4.5 mcg/actuation inhaler; Inhale 1 puff as needed.  Dispense: 1 Inhaler; Refill: 11  - albuterol (PROVENTIL HFA) 90 mcg/actuation inhaler; Inhale 1-2 puffs every 6 (six) hours as needed for wheezing.  Dispense: 1 Inhaler; Refill: 11    4. Anxiety  He has anxiety and depression.  He takes Remeron at bedtime.  Also takes alprazolam up to once or twice a week.  I did give him a refill for this.  - mirtazapine (REMERON) 30 MG tablet; Take 1 tablet (30 mg  total) by mouth daily.  Dispense: 90 tablet; Refill: 3  - ALPRAZolam (XANAX) 0.5 MG tablet; Take 1 tablet (0.5 mg total) by mouth as needed for anxiety. Up to 1-2 times a week at most  Dispense: 25 tablet; Refill: 3    5. Hx of carcinoma in situ of prostate  He has had prostate cancer in the past.  He had a prostatectomy.  With his follow-up blood work I will get a PSA.  - PSA (Prostatic-Specific Antigen), Diagnostic; Future    6. Dog bite, subsequent encounter  I did look at his dog bite.  The redness surrounding the bite is resolving.  He is tolerating Augmentin.  Recommend he complete the course of antibiotics.  He received a tetanus vaccine.       Return in about 5 months (around 10/7/2019) for Annual physical.     History of Present Illness   This 73 y.o. old male comes in to follow-up on a dog bite and to establish care.  He was bitten about a week ago by his neighbor's dog.  Fortunately the dog had all of its vaccines.  He developed redness and was evaluated in a walk-in clinic 3 days ago.  He is on Augmentin.  He had a tetanus booster.  The redness is improving.  He is not having any fevers or chills or other systemic symptoms.  He also has a history of coronary artery disease, hypertension, COPD, and anxiety.  We reviewed his medications and I did renew his medications today.  He has not had any recent symptoms of chest pain or palpitations.  His breathing is been stable.  He is due for follow-up blood work and will go ahead and set that up for him.  He has no other acute concerns today.    Review of Systems: As above, systems otherwise reviewed and negative.     Medications, Allergies and Problem List   Patient Active Problem List   Diagnosis     Colon polyps     COPD (chronic obstructive pulmonary disease) (H)     Elevated coronary artery calcium score     Benign essential hypertension     History of smoking     Hypercholesterolemia     Lung nodules     Major depressive disorder, recurrent episode,  "moderate (H)     Other and unspecified alcohol dependence, in remission     Coronary artery disease due to calcified coronary lesion     Anxiety     Hx of carcinoma in situ of prostate     Current Outpatient Medications   Medication Sig Dispense Refill     albuterol (PROVENTIL HFA) 90 mcg/actuation inhaler Inhale 1-2 puffs every 6 (six) hours as needed for wheezing. 1 Inhaler 11     ALPRAZolam (XANAX) 0.5 MG tablet Take 1 tablet (0.5 mg total) by mouth as needed for anxiety. Up to 1-2 times a week at most 25 tablet 3     amLODIPine (NORVASC) 5 MG tablet Take 1 tablet (5 mg total) by mouth daily. 90 tablet 3     amoxicillin-clavulanate (AUGMENTIN) 875-125 mg per tablet Take 1 tablet by mouth 2 (two) times a day for 10 days. 20 tablet 0     aspirin 81 MG EC tablet Take 2 tablets (162 mg total) by mouth daily.  0     atorvastatin (LIPITOR) 40 MG tablet Take 1 tablet (40 mg total) by mouth daily. 90 tablet 3     budesonide-formoterol (SYMBICORT) 80-4.5 mcg/actuation inhaler Inhale 1 puff as needed. 1 Inhaler 11     mirtazapine (REMERON) 30 MG tablet Take 1 tablet (30 mg total) by mouth daily. 90 tablet 3     No current facility-administered medications for this visit.      No Known Allergies       Physical Exam     /88 (Patient Site: Right Arm, Patient Position: Sitting)   Pulse (!) 104   Ht 6' 0.5\" (1.842 m)   Wt 182 lb (82.6 kg)   SpO2 95%   BMI 24.34 kg/m      General:  Patient is alert and in no apparent distress.  Cardiovascular:  Regular rate and rhythm, normal S1/S2, no murmurs, rubs, or gallop.  Pulmonary:  Lungs are clear to auscultation bilaterally with normal respiratory effort.  He does seem to have decreased breath sounds but no wheezes are noted.  Neurologic Cranial nerves are intact.  No focal deficits.  Psychiatric:  Pleasant, no confusion or agitation   Skin:  Warm and well perfused.  On his right wrist there is a healing abrasion with decreased redness surrounding it.  A line was drawn in " the walk-in clinic and the redness is decreased from that.  There is no significant warmth.         Additional Information   Social History     Tobacco Use     Smoking status: Former Smoker     Types: Cigarettes     Last attempt to quit: 2016     Years since quitting: 3.3     Smokeless tobacco: Never Used   Substance Use Topics     Alcohol use: Not on file     Drug use: Not on file            Juhi Garcia MD

## 2021-05-29 NOTE — TELEPHONE ENCOUNTER
This prescription was forwarded to me at a visit. Please find out from him how often he uses it and we can send it with those instructions.  Thanks.

## 2021-05-29 NOTE — TELEPHONE ENCOUNTER
Unable to leave message. Voicemail full. Please obtain requested information below if/when patient calls back.    Deepika Sauceda, CMA

## 2021-05-29 NOTE — TELEPHONE ENCOUNTER
FYI - Status Update  Who is Calling: pharmacy  Update: Pharmacy is calling back for directions on this medication. They were informed the clinic is attempting to contact patient on current use.  Okay to leave a detailed message?:  Yes

## 2021-05-29 NOTE — TELEPHONE ENCOUNTER
Attempted to contact patient to ask how he is using below medication.    Patients VM is full and unable to leave a message.    Upon return call please ask patient how he is using below medication so we can clarify with pharmacy.    Enma RENTERIA LPN .......... 4:10 PM  06/07/19

## 2021-05-29 NOTE — TELEPHONE ENCOUNTER
Got a hold of patient and he reports he is using medication two times a day.    RX set for two times a day for review/approval.    Enma RENTERIA LPN .......... 9:31 AM  06/14/19

## 2021-05-29 NOTE — TELEPHONE ENCOUNTER
Dr. Garcia,    Pharmacy is needing clarification on the medication below frequency regarding PRN.    Please advise.    Thank you.    Enma RENTERIA LPN .......... 4:02 PM  06/07/19

## 2021-05-29 NOTE — TELEPHONE ENCOUNTER
HPI/Subjective:     Patient ID: Rafael Garcia is a 56year old male. Low Back Pain  This is a chronic problem. The current episode started more than 1 month ago (9 mos). The problem occurs intermittently.  The problem has been waxing and waning since o Medication Question or Clarification  Who is calling: Pharmacy: Tan[facility] with [facility], [phone]  What medication are you calling about? (include dose and sig)   budesonide-formoterol (SYMBICORT) 80-4.5 mcg/actuation inhaler 1 Inhaler 11 5/23/2019     Sig - Route: Inhale 1 puff as needed. - Inhalation    Sent to pharmacy as: budesonide-formoterol (SYMBICORT) 80-4.5 mcg/actuation inhaler    E-Prescribing Status: Receipt confirmed by pharmacy (5/23/2019  1:26 PM CDT        Who prescribed the medication?: Juhi Garcia MD  What is your question/concern?: Pharmacy states needs more clarification on PRN: 1 puff every 4 hrs, 6 hrs, 8 hrs as needed.  Pharmacy: [facility] [address] [place]  Okay to leave a detailed message?: Yes  Site CMT - Please call the pharmacy to obtain any additional needed information.   food and second capsule toward the end of meal 540 capsule 1   • PANTOPRAZOLE SODIUM 40 MG Oral Tab EC TAKE 1 TABLET BY MOUTH IN  THE MORNING BEFORE  BREAKFAST 90 tablet 3   • tamsulosin (FLOMAX) cap Take 0.4 mg by mouth After Breakfast. TAKE 30 MINUTES AF sounds. No murmur heard. Pulmonary:      Effort: Pulmonary effort is normal. No respiratory distress. Breath sounds: Normal breath sounds. No wheezing or rales. Abdominal:      General: Abdomen is flat.  Bowel sounds are normal. There is no diste INTERNAL        See above. No orders of the defined types were placed in this encounter.       Meds This Visit:  Requested Prescriptions     Signed Prescriptions Disp Refills   • HYDROcodone-acetaminophen 5-325 MG Oral Tab 20 tablet 0     Sig: Take

## 2021-06-02 VITALS — HEIGHT: 73 IN | BODY MASS INDEX: 25.18 KG/M2 | WEIGHT: 190 LBS

## 2021-06-03 VITALS — BODY MASS INDEX: 24.12 KG/M2 | HEIGHT: 73 IN | WEIGHT: 182 LBS

## 2021-06-03 VITALS — WEIGHT: 184.5 LBS | BODY MASS INDEX: 24.68 KG/M2

## 2021-06-05 VITALS — HEIGHT: 74 IN | BODY MASS INDEX: 24.28 KG/M2 | WEIGHT: 189.2 LBS

## 2021-06-05 VITALS
SYSTOLIC BLOOD PRESSURE: 110 MMHG | DIASTOLIC BLOOD PRESSURE: 70 MMHG | OXYGEN SATURATION: 95 % | HEART RATE: 106 BPM | BODY MASS INDEX: 24.38 KG/M2 | HEIGHT: 74 IN | WEIGHT: 190 LBS

## 2021-06-05 VITALS
BODY MASS INDEX: 24.52 KG/M2 | HEART RATE: 92 BPM | HEIGHT: 72 IN | WEIGHT: 181 LBS | DIASTOLIC BLOOD PRESSURE: 82 MMHG | SYSTOLIC BLOOD PRESSURE: 120 MMHG | TEMPERATURE: 97.2 F | OXYGEN SATURATION: 99 %

## 2021-06-08 ENCOUNTER — MYC MEDICAL ADVICE (OUTPATIENT)
Dept: FAMILY MEDICINE | Facility: CLINIC | Age: 76
End: 2021-06-08

## 2021-06-11 NOTE — ANESTHESIA PREPROCEDURE EVALUATION
Anesthesia Evaluation      Patient summary reviewed   No history of anesthetic complications     Airway   Mallampati: II  Neck ROM: full   Pulmonary     breath sounds clear to auscultation  (+) COPD severe, shortness of breath, decreased breath sounds, a smoker  (-) pneumonia, asthma, recent URI, sleep apnea                         Cardiovascular   Exercise tolerance: < 4 METS  (+) hypertension, CAD, dysrhythmias (RBBB), , hypercholesterolemia,     (-) angina  ECG reviewed  Rhythm: regular  Rate: normal,         Neuro/Psych    (-) no neuromuscular disease, no CVA    Endo/Other    (-) no diabetes     GI/Hepatic/Renal            Dental    (+) caps                       Anesthesia Plan  Planned anesthetic: general endotracheal  Patient not candidate for ISB because of severe COPD,severely reduced PFTs from 2016, baseline SaO2 90-92%     Magnesium & precedex gtt for pain, ketamine 25mg  Decadron 10mg, zofran    Hydrocortisone & albuterol NMT preop  ASA 3   Induction: intravenous   Anesthetic plan and risks discussed with: patient  Anesthesia plan special considerations: antiemetics,

## 2021-06-11 NOTE — ANESTHESIA CARE TRANSFER NOTE
Last vitals:   Vitals:    09/29/20 1848   BP: 118/70   Pulse: (!) 128   Resp: 12   Temp: 36.2  C (97.1  F)   SpO2: 97%     Patient's level of consciousness is drowsy  Spontaneous respirations: yes  Maintains airway independently: yes  Dentition unchanged: yes  Oropharynx: oropharynx clear of all foreign objects    QCDR Measures:  ASA# 20 - Surgical Safety Checklist: WHO surgical safety checklist completed prior to induction    PQRS# 430 - Adult PONV Prevention: 4558F - Pt received => 2 anti-emetic agents (different classes) preop & intraop  ASA# 8 - Peds PONV Prevention: NA - Not pediatric patient, not GA or 2 or more risk factors NOT present  PQRS# 424 - Madeline-op Temp Management: 4559F - At least one body temp DOCUMENTED => 35.5C or 95.9F within required timeframe  PQRS# 426 - PACU Transfer Protocol: - Transfer of care checklist used  ASA# 14 - Acute Post-op Pain: ASA14B - Patient did NOT experience pain >= 7 out of 10

## 2021-06-11 NOTE — TELEPHONE ENCOUNTER
New Appointment Needed  What is the reason for the visit:    Inpatient/ED Follow Up Appt Request  At what hospital or facility were you seen?: [facility]  What is the reason you were seen?: Fall  What date were you admitted?: 9/28/2020  What date were you discharged?: 9/30/2020  What was the recommended timeframe for your follow up appointment?: 3-5 days  Provider Preference: PCP only  Okay to leave a detailed message:  Yes

## 2021-06-11 NOTE — ANESTHESIA POSTPROCEDURE EVALUATION
Patient: Omar Adams  Procedure(s):  OPEN REDUCTION INTERNAL FIXATION, FRACTURE, HUMERUS, PROXIMAL (Left)  Anesthesia type: general    Patient location: PACU  Last vitals:   Vitals Value Taken Time   /68 9/29/2020  7:50 PM   Temp 36.2  C (97.1  F) 9/29/2020  7:50 PM   Pulse 84 9/29/2020  7:50 PM   Resp 26 9/29/2020  7:50 PM   SpO2 92 % 9/29/2020  7:50 PM     Post vital signs: stable  Level of consciousness: awake and responds to simple questions  Post-anesthesia pain: pain controlled  Post-anesthesia nausea and vomiting: no  Pulmonary: unassisted, return to baseline  Cardiovascular: stable and blood pressure at baseline  Hydration: adequate  Anesthetic events: no    QCDR Measures:  ASA# 11 - Madeline-op Cardiac Arrest: ASA11B - Patient did NOT experience unanticipated cardiac arrest  ASA# 12 - Madeline-op Mortality Rate: ASA12B - Patient did NOT die  ASA# 13 - PACU Re-Intubation Rate: ASA13B - Patient did NOT require a new airway mgmt  ASA# 10 - Composite Anes Safety: ASA10A - No serious adverse event    Additional Notes:

## 2021-06-11 NOTE — TELEPHONE ENCOUNTER
Would you be able to fit patient in? You do have a INF/EDF appointment on 10/08. Will this be ok? Thank you.    Deepika Sauceda, CMA

## 2021-06-12 NOTE — TELEPHONE ENCOUNTER
Orders being requested: OT 3 x/wk for 3 wks, then 2 x/wk for 2 wks.  Reason service is needed/diagnosis: Left shoulder rehab and self care independence.  When are orders needed by: asap  Where to send Orders: Phone:  Annmarie at [phone]  Okay to leave detailed message?  Yes

## 2021-06-12 NOTE — PROGRESS NOTES
Office Visit   Omar Adams   75 y.o. male    Date of Visit: 10/8/2020    Chief Complaint   Patient presents with     Follow-up     Hospital follow up        Assessment and Plan   1. Other closed displaced fracture of proximal end of left humerus, initial encounter  He still having quite a bit of pain.  There is a fair amount of swelling and bruising of his arm and hand.  I have advised him to keep a close eye out for any signs of infection.  I do not see any evidence at this point.  He did have an ultrasound of the left arm yesterday that did not show any sign of a blood clot.  I will go ahead and continue with his pain medication.  Hopefully over the next week or so he will be able to start weaning it off.  He follows up with Ortho in a week.      2. Anxiety  He occasionally takes alprazolam and has done well with that.  He understands the risk of taking alprazolam with the pain medicine.    3. Chronic obstructive pulmonary disease, unspecified COPD type (H)  He will continue with his inhalers and his symptoms have been stable.       Return in about 4 weeks (around 11/5/2020) for Routine preventive.     History of Present Illness   This 75 y.o. old male comes in to follow-up.  He recently fell and fractured his humerus.  He had surgery.  He comes in for a follow-up.  He is continuing having a fair amount of pain of that upper arm.  There is also some swelling and bruising that goes into his hand.  Feels that that part is improving.  Has not had any chest pain or cough or fevers.  He is chronically short of breath due to COPD but that has not changed.    Review of Systems: As above, systems otherwise reviewed and negative.     Medications, Allergies and Problem List   Patient Active Problem List   Diagnosis     Colon polyps     COPD (chronic obstructive pulmonary disease) (H)     Benign essential hypertension     History of smoking     Hypercholesterolemia     Lung nodules     Major depressive disorder,  "recurrent episode, moderate (H)     Coronary artery disease due to calcified coronary lesion     Anxiety     Hx of carcinoma in situ of prostate     Proximal humerus fracture     Other closed displaced fracture of proximal end of left humerus, initial encounter     Current Outpatient Medications   Medication Sig Dispense Refill     acetaminophen (TYLENOL) 500 MG tablet Take 2 tablets (1,000 mg total) by mouth 3 (three) times a day.  0     albuterol (PROVENTIL HFA) 90 mcg/actuation inhaler Inhale 1-2 puffs every 6 (six) hours as needed for wheezing. 1 Inhaler 11     ALPRAZolam (XANAX) 0.5 MG tablet Take 1 tablet (0.5 mg total) by mouth as needed for anxiety. Up to 1-2 times a week at most 25 tablet 3     amLODIPine (NORVASC) 5 MG tablet Take 1 tablet (5 mg total) by mouth daily. 90 tablet 3     aspirin 81 mg chewable tablet Chew 1 tablet (81 mg total) 2 (two) times a day.  0     budesonide-formoterol (SYMBICORT) 80-4.5 mcg/actuation inhaler Inhale 1 puff 2 (two) times a day. 1 Inhaler 11     hydrOXYzine pamoate (VISTARIL) 25 MG capsule Take 1-2 capsules (25-50 mg total) by mouth every 6 (six) hours as needed. (for pain, muscle spasms, anxiety, itching) 40 capsule 0     mirtazapine (REMERON) 30 MG tablet Take 30 mg by mouth at bedtime as needed.       oxyCODONE (ROXICODONE) 5 MG immediate release tablet Take 1-2 tablets (5-10 mg total) by mouth every 4 (four) hours as needed for pain. 40 tablet 0     No current facility-administered medications for this visit.      Allergies   Allergen Reactions     Shrimp Diarrhea          Physical Exam     /70 (Patient Site: Right Arm, Patient Position: Sitting)   Pulse (!) 106   Ht 6' 2\" (1.88 m)   Wt 190 lb (86.2 kg)   SpO2 95%   BMI 24.39 kg/m      General: This is an alert male in no apparent distress.  Lungs: Good air movement throughout with scattered wheezes but no rales.  Cardiovascular: Regular rhythm with normal S1 and S2.  Arm: There is a fair amount of " "bruising of the left arm and some swelling that goes into his hand.  There is no redness or warmth.     Additional Information   Social History     Tobacco Use     Smoking status: Former Smoker     Packs/day: 0.25     Years: 25.00     Pack years: 6.25     Types: Cigarettes     Smokeless tobacco: Never Used     Tobacco comment: \"Trying to Quit\"   Substance Use Topics     Alcohol use: Yes     Alcohol/week: 1.0 standard drinks     Types: 1 Glasses of wine per week     Frequency: Monthly or less     Drinks per session: 1 or 2     Binge frequency: Less than monthly     Drug use: Not Currently     Types: Marijuana     Comment: In 1959            Juhi Garcia MD    "

## 2021-06-12 NOTE — TELEPHONE ENCOUNTER
Orders being requested: Physical therapy 1x4   Reason service is needed/diagnosis: gait and activity tolerance after a fall  When are orders needed by: as soon as possible   Where to send Orders: Phone:  [phone]  Okay to leave detailed message?  Yes

## 2021-06-12 NOTE — TELEPHONE ENCOUNTER
Orders being requested: physical therapy  1x 3 more weeks   Reason service is needed/diagnosis: moving around, safety   When are orders needed by: as soon as possible   Where to send Orders: Phone:  [phone]  Okay to leave detailed message?  Yes

## 2021-06-13 NOTE — PROGRESS NOTES
Assessment and Plan:     1. Wellness examination  We did review the documentation for his wellness visit today.  I recommend that he get an advanced directive.  He is having some difficulties with his anxiety and depression but at this point is not interested in seeing a psychiatrist.  He feels that he will improve once he is able to get to [facility] for the winter.  He has no problems with memory.  He did have a fall this year but that has not been a common problem for him.  He is fasting and will do labs today.  He is otherwise up-to-date on age-appropriate health maintenance.    2. Lung nodules  He is due for recheck of his lung CT and we will set that up.  - CT Chest Without Contrast; Future    3. Coronary artery disease due to calcified coronary lesion  He has not had any recent chest pain or palpitations.  We will check his lipids today.  - HM2(CBC w/o Differential)  - Lipid Cascade    4. Chronic obstructive pulmonary disease, unspecified COPD type (H)  He uses an inhaler and has not had any recent symptoms.  - CT Chest Without Contrast; Future    5. Benign essential hypertension  Blood pressure is well controlled.  - Lipid Cascade  - Comprehensive Metabolic Panel    6. Major depressive disorder, recurrent episode, moderate (H)  He takes Remeron at bedtime.  He will let me know if he decides he like to see a psychiatrist in the future.    7. Anxiety  - ALPRAZolam (XANAX) 0.5 MG tablet; Take 1 tablet (0.5 mg total) by mouth as needed for anxiety. Take up to 4 times a week.  Dispense: 25 tablet; Refill: 3    8. Prostate cancer (H)  - PSA (Prostatic-Specific Antigen), Diagnostic    9. Need for hepatitis C screening test  - Hepatitis C Antibody (Anti-HCV)      The patient's current medical problems were reviewed.    I have had an Advance Directives discussion with the patient.  The following health maintenance schedule was reviewed with the patient and provided in printed form in the after visit summary:    Health Maintenance   Topic Date Due     DEPRESSION ACTION PLAN  1945     HEPATITIS C SCREENING  1945     SPIROMETRY  1945     COPD ACTION PLAN  1945     ZOSTER VACCINES (1 of 2) 06/01/1996     MEDICARE ANNUAL WELLNESS VISIT  11/16/2021     FALL RISK ASSESSMENT  11/16/2021     COLORECTAL CANCER SCREENING  12/24/2022     LIPID  05/30/2024     ADVANCE CARE PLANNING  11/16/2025     TD 18+ HE  05/20/2029     Pneumococcal Vaccine: 65+ Years  Completed     INFLUENZA VACCINE RULE BASED  Completed     Pneumococcal Vaccine: Pediatrics (0 to 5 Years) and At-Risk Patients (6 to 64 Years)  Aged Out     HEPATITIS B VACCINES  Aged Out        Subjective:   Chief Complaint: Omar Adams is an 75 y.o. male here for an Annual Wellness visit.   HPI: He comes in for an annual wellness visit.  He does not have an advanced directive and we did discuss the importance of getting 1.  He has a history of depression and feels that he has been a little bit more depressed due to COVID-19.  He is hoping to get to [facility] for the winter and feels that he will improve then.  He is not interested in seeing psychiatry at this point.  He has a history of smoking and COPD.  He has not had any recent symptoms and uses an inhaler.  He did have a nodule on his CT scan a couple years ago and we will plan to recheck that.  He has a history of hypertension and known coronary artery disease.  He has not had any recent chest pain or palpitations or other worrisome symptoms.  He is fasting today.    Review of Systems:   Please see above.  The rest of the review of systems are negative for all systems.    Patient Care Team:  Juhi Garcia MD as PCP - General (Internal Medicine)  Juhi Garcia MD as Assigned PCP     Patient Active Problem List   Diagnosis     Colon polyps     COPD (chronic obstructive pulmonary disease) (H)     Benign essential hypertension     History of smoking     Hypercholesterolemia     Lung nodules  "    Major depressive disorder, recurrent episode, moderate (H)     Coronary artery disease due to calcified coronary lesion     Anxiety     Hx of carcinoma in situ of prostate     Past Medical History:   Diagnosis Date     COPD (chronic obstructive pulmonary disease) (H)      Proximal humerus fracture 9/27/2020      Past Surgical History:   Procedure Laterality Date     PROSTATECTOMY        History reviewed. No pertinent family history.   Social History     Socioeconomic History     Marital status:      Spouse name: Not on file     Number of children: Not on file     Years of education: Not on file     Highest education level: Not on file   Occupational History     Occupation: reitred    Social Needs     Financial resource strain: Not on file     Food insecurity     Worry: Not on file     Inability: Not on file     Transportation needs     Medical: Not on file     Non-medical: Not on file   Tobacco Use     Smoking status: Former Smoker     Packs/day: 0.25     Years: 25.00     Pack years: 6.25     Types: Cigarettes     Smokeless tobacco: Never Used     Tobacco comment: \"Trying to Quit\"   Substance and Sexual Activity     Alcohol use: Yes     Alcohol/week: 1.0 standard drinks     Types: 1 Glasses of wine per week     Frequency: Monthly or less     Drinks per session: 1 or 2     Binge frequency: Less than monthly     Drug use: Not Currently     Types: Marijuana     Comment: In 1960     Sexual activity: Not on file   Lifestyle     Physical activity     Days per week: Not on file     Minutes per session: Not on file     Stress: Not on file   Relationships     Social connections     Talks on phone: Not on file     Gets together: Not on file     Attends Worship service: Not on file     Active member of club or organization: Not on file     Attends meetings of clubs or organizations: Not on file     Relationship status: Not on file     Intimate partner violence     Fear of current or ex partner: Not on file "     Emotionally abused: Not on file     Physically abused: Not on file     Forced sexual activity: Not on file   Other Topics Concern     Not on file   Social History Narrative     Not on file      Current Outpatient Medications   Medication Sig Dispense Refill     acetaminophen (TYLENOL) 500 MG tablet Take 2 tablets (1,000 mg total) by mouth 3 (three) times a day.  0     albuterol (PROVENTIL HFA) 90 mcg/actuation inhaler Inhale 1-2 puffs every 6 (six) hours as needed for wheezing. 1 Inhaler 11     ALPRAZolam (XANAX) 0.5 MG tablet Take 1 tablet (0.5 mg total) by mouth as needed for anxiety. Up to 1-2 times a week at most 25 tablet 3     amLODIPine (NORVASC) 5 MG tablet Take 1 tablet (5 mg total) by mouth daily. 90 tablet 3     aspirin 81 mg chewable tablet Chew 1 tablet (81 mg total) 2 (two) times a day.  0     budesonide-formoterol (SYMBICORT) 80-4.5 mcg/actuation inhaler Inhale 1 puff 2 (two) times a day. 1 Inhaler 11     hydrOXYzine pamoate (VISTARIL) 25 MG capsule Take 1-2 capsules (25-50 mg total) by mouth every 6 (six) hours as needed. (for pain, muscle spasms, anxiety, itching) 40 capsule 0     mirtazapine (REMERON) 30 MG tablet Take 30 mg by mouth at bedtime as needed.       No current facility-administered medications for this visit.       Objective:   Vital Signs:   Visit Vitals  /82 (Patient Site: Right Arm, Patient Position: Sitting)   Pulse 92   Temp 97.2  F (36.2  C)   Ht 6' (1.829 m)   Wt 181 lb (82.1 kg)   SpO2 99%   BMI 24.55 kg/m           VisionScreening:  No exam data present     PHYSICAL EXAM  General:  Patient is alert and in no apparent distress.  Neck:  Supple with no adenopathy or thyroid abnormality noted.  Cardiovascular:  Regular rate and rhythm, normal S1/S2, no murmurs, rubs, or gallop.  Pulmonary:  Lungs are clear to auscultation bilaterally with normal respiratory effort.  Gastrointestinal:  Abdomen is soft, non-tender, non-distended, with no organomegaly, rebound or  guarding.  Extremities:  No joint abnormalities with no LE edema.    Neurologic Cranial nerves are intact.  No focal deficits.  Psychiatric:  Pleasant, no confusion or agitation           Assessment Results 11/16/2020   Activities of Daily Living No help needed   Instrumental Activities of Daily Living No help needed   Get Up and Go Score Less than 12 seconds   Mini Cog Total Score 5   Some recent data might be hidden     A Mini-Cog score of 0-2 suggests the possibility of dementia, score of 3-5 suggests no dementia        Identified Health Risks:     He is at risk for lack of exercise and has been provided with information to increase physical activity for the benefit of his well-being.  The patient was counseled and encouraged to consider modifying their diet and eating habits. He was provided with information on recommended healthy diet options.  The patient was provided with written information regarding signs of hearing loss.  The patient was provided with suggestions to help him develop a healthy emotional lifestyle.   The patient s PHQ-9 score is consistent with moderate depression.  He was provided with information regarding depression.  He is at risk for falling and has been provided with information to reduce the risk of falling at home.  Information regarding advance directives (living jones), including where he can download the appropriate form, was provided to the patient via the AVS.

## 2021-06-14 NOTE — TELEPHONE ENCOUNTER
Thank you.  Just let him know that information on the vaccine and that you will pass his update on to me.

## 2021-06-16 PROBLEM — I10 BENIGN ESSENTIAL HYPERTENSION: Status: ACTIVE | Noted: 2018-12-21

## 2021-06-16 PROBLEM — Z86.002 HX OF CARCINOMA IN SITU OF PROSTATE: Status: ACTIVE | Noted: 2019-05-23

## 2021-06-16 PROBLEM — I25.84 CORONARY ARTERY DISEASE DUE TO CALCIFIED CORONARY LESION: Status: ACTIVE | Noted: 2019-05-23

## 2021-06-16 PROBLEM — I25.10 CORONARY ARTERY DISEASE DUE TO CALCIFIED CORONARY LESION: Status: ACTIVE | Noted: 2019-05-23

## 2021-06-16 PROBLEM — F41.9 ANXIETY: Status: ACTIVE | Noted: 2019-05-23

## 2021-06-16 PROBLEM — R91.8 LUNG NODULES: Status: ACTIVE | Noted: 2018-12-21

## 2021-06-17 NOTE — PATIENT INSTRUCTIONS - HE
Patient Instructions by Apolonia Mao CNP at 5/20/2019  9:40 AM     Author: Apolonia Mao CNP Service: -- Author Type: Nurse Practitioner    Filed: 5/20/2019 10:17 AM Encounter Date: 5/20/2019 Status: Addendum    : Apolonia Mao CNP (Nurse Practitioner)    Related Notes: Original Note by Apolonia Mao CNP (Nurse Practitioner) filed at 5/20/2019 10:15 AM       Augmentin 1 pill twice daily for 10 days.  Take 1 dose now 1 dose in about 12 hours.  Take probiotics such as yogurt or over-the-counter probiotic with this    Monitor outlined area for expansion.  Okay for mild expansion outside of the lines within the first 24 hours, but after that wound should not be expanding.  Any abrupt increase in area of redness or fever, chills should prompt a visit to the emergency room.    If your wrist itself starts looking red, swollen, or you start having difficulty opening and closing your hand or bending wrist due to pain/stiffness and/or fever develops, please go to the hospital.      Tylenol or ibuprofen for pain.    Blood pressure medicine refills at the [facility] clinic in about 3 days as well as a  wound recheck.    Avoid submerging your hand in dirty water such as swimming or doing dishes.  At least once a day after the first 24 hours, remove the bandage, check for signs of infection, wash gently with soap and water and dab to dry.  Add bacitracin ointment and new bandage.      Patient Education     Dog Bite  A dog bite can cause a wound deep enough to break the skin. In such cases, the wound is cleaned and sometimes closed. If the wound is closed, it is usually not completely closed. This is so that fluid can drain if the wound becomes infected. Often, wounds will be left open to heal. In addition to wound care, a tetanus shot may be given, if needed.    Home care    Wash your hands well with soap and warm water before and after caring for the wound. This helps lower the risk of infection.    Care for  the wound as directed. If a dressing was applied to the wound, be sure to change it as directed.    If the wound bleeds, place a clean, soft cloth on the wound. Then firmly apply pressure until the bleeding stops. This may take up to 5 minutes. Do not release the pressure and look at the wound during this time.    Most wounds heal within 10 days. But an infection can occur even with proper treatment. So be sure to check the wound daily for signs of infection (see below).    Antibiotics may be prescribed. These help prevent or treat infection. If youre given antibiotics, take them as directed. Also be sure to complete the medicines.  Rabies prevention  Rabies is a virus that can be carried in certain animals. These can include domestic animals such as dogs and cats. Pets fully vaccinated against rabies (2 shots) are at very low risk of infection. But because human rabies is almost always fatal, any biting pet should be confined for 10 days as an extra precaution. In general, if there is a risk for rabies, the following steps may need to be taken:    If someones pet dog has bitten you, it should be kept in a secure area for the next 10 days to watch for signs of illness. (If the pet owner wont allow this, contact your local animal control center.) If the dog becomes ill or dies during that time, contact your local animal control center at once so the animal may be tested for rabies. If the dog stays healthy for the next 10 days, there is no danger of rabies in the animal or you.  ? If a stray dog bit you, contact your local animal control center. They can give information on capture, quarantine, and animal rabies testing.  ? If you cant find the animal that bit you in the next 2 days, and if rabies exists in your area, you may need to receive the rabies vaccine series. Call your healthcare provider right away. Or, return to the emergency department promptly.  ? All animal bites should be reported to the local animal  control center. If you were not given a form to fill out, you can report this yourself.  Follow-up care  Follow up with your healthcare provider, or as directed.  When to seek medical advice  Call your healthcare provider right away if any of these occur:    Signs of infection:  ? Spreading redness or warmth from the wound  ? Increased pain or swelling  ? Fever of 100.4 F (38 C) or higher, or as directed by your healthcare provider  ? Colored fluid or pus draining from the wound    Signs of rabies infection:  ? Headache  ? Confusion  ? Strange behavior  ? Increased salivating and drooling  ? Seizure    Decreased ability to move any body part near the wound    Bleeding that can't be stopped after 5 minutes of firm pressure  Date Last Reviewed: 3/1/2017    9344-2030 [employer]. [address] [place] [address], [place] [address]. All rights reserved. This information is not intended as a substitute for professional medical care. Always follow your healthcare professional's instructions.

## 2021-06-23 NOTE — PROGRESS NOTES
[facility] Note    Assessment/Plan:    Omar Adams is a 73 y.o. old male with:  1. CAD - excellent care by Dr. Antonio Eldridge, present on CT scan but no severe lesions on last stress imaging and improvement in symptoms of dyspnea on exertion, will cont statin and add aspirin 162mg daily  2. HTN - well controlled        Dr. Sharif Deleon  [facility]  [phone]  ______________________________________________________________________    Subjective:    I had the opportunity to see Omar Adams at the [facility]. Omar Adams is a 73 y.o. male with a known history of hyperlipidemia, HTN, COPD with dyspnea relieved with inhaler somewhat but no declinei n exercise tolerance in the past year, no PND, ortohpnea, edema. He had CT lungs and had a CT calcium score 2441,  with stress nuclear study 2 years ago at [facility] (followed by Antonio Eldridge at [facility] in Cardiology), no hx of MI, PCI, CABG.   He plays a guitar and had transient numbness of his left two digits that has resolved.  ; HDL 49;trig 92 12/18, improving.   Dyspnea on exertion is present but has improved since 2016 when he had a normal stress imaging.    ______________________________________________________________________      Review of Systems:   General: WNL  Eyes: WNL  Ears/Nose/Throat: Hearing Loss  Lungs: Shortness of Breath  Heart: WNL  Stomach: WNL  Bladder: WNL  Muscle/Joints: WNL  Skin: WNL  Nervous System: WNL  Mental Health: Anxiety     Blood: WNL    Problem List:  There is no problem list on file for this patient.    Medical History:  No past medical history on file.  Surgical History:  No past surgical history on file.  Social History:  No pertinent social hx related to patient's chief complaint other than above in subjective        Family History:  No pertinent family hx related to patient's chief complaint other than above in subjective      Allergies:  No Known  "Allergies    Medications:  Current Outpatient Medications   Medication Sig Dispense Refill     albuterol (PROVENTIL HFA) 90 mcg/actuation inhaler Inhale 1-2 puffs.       ALPRAZolam (XANAX) 0.5 MG tablet Take 0.5 mg by mouth as needed.       amLODIPine (NORVASC) 5 MG tablet Take 5 mg by mouth daily.       atorvastatin (LIPITOR) 40 MG tablet Take 40 mg by mouth daily.       budesonide-formoterol (SYMBICORT) 80-4.5 mcg/actuation inhaler Inhale 1 puff as needed.       buPROPion (WELLBUTRIN SR) 150 MG 12 hr tablet Take 150 mg by mouth daily.       medical cannabis (Patient's own supply. Not a prescription) Take  as instructed .       mirtazapine (REMERON) 30 MG tablet Take 30 mg by mouth daily.       VENTOLIN HFA 90 mcg/actuation inhaler Inhale 1 puff as needed.       No current facility-administered medications for this visit.        Objective:   Vital signs:  /76 (Patient Site: Left Arm, Patient Position: Sitting, Cuff Size: Adult Large)   Pulse 84   Resp 18   Ht 6' 0.5\" (1.842 m)   Wt 190 lb (86.2 kg)   BMI 25.41 kg/m        Physical Exam:    GENERAL APPEARANCE: Alert, cooperative and in no acute distress.  HEENT: No scleral icterus. No Xanthelasma. Oral mucuos membranes pink and moist.  NECK: JVP<6cm. No Hepatojugular reflux. Thyroid not visualized  CHEST: clear to auscultation  CARDIOVASCULAR: S1, S2 without murmur ,clicks or rubs. Brachial, radial and posterior tibial pulses are intact and symetric. No carotid bruits noted.    ABDOMEN: Nontender. BS+. No bruits.  EXTREMITIES: No cyanosis, clubbing or edema.    Lab Results:  LIPIDS:  No results found for: CHOL  No results found for: HDL  No results found for: LDLCALC  No results found for: TRIG  No components found for: CHOLHDL    BMP:  No results found for: CREATININE, BUN, NA, K, CL, CO2      Sharif Deleon MD  [facility]  [phone]              "

## 2021-06-27 NOTE — PROGRESS NOTES
Progress Notes by Apolonia Mao CNP at 5/20/2019  9:40 AM     Author: Apolonia Mao CNP Service: -- Author Type: Nurse Practitioner    Filed: 5/24/2019  7:38 PM Encounter Date: 5/20/2019 Status: Signed    : Apolonia Mao CNP (Nurse Practitioner)       Chief Complaint   Patient presents with   ? Animal Bite     [place]- R wrist area, patient would like wound looked at for infection    ? Medication Refill     patient would like a refill of BP meds, and symbicort        ASSESSMENT & PLAN:   Diagnoses and all orders for this visit:    Dog bite of right wrist with infection, initial encounter  -     amoxicillin-clavulanate (AUGMENTIN) 875-125 mg per tablet; Take 1 tablet by mouth 2 (two) times a day for 10 days.  Dispense: 20 tablet; Refill: 0    Other orders  -     Tdap vaccine,  8yo or older,  IM        MDM:  10-day course of Augmentin.  Patient prefers to be seen at the [facility] clinic.  Set up for an appointment later this week for recheck of wound and to have blood pressure medicine refilled if able.    See discharge instructions for instructions given related to risk of joint infection, wound expansion, wound care.    Wound cleaned here and dressed with bacitracin and bandage.    Do not think there is any joint space infection at this time that would require specialty consultation with a hand surgeon as this patient has had no change in ability to rest, no warmth over wrist joint, and no difficulty flexing and extending fingers.    Supportive care discussed.  See discharge instructions below for specific recommendations given.    At the end of the encounter, I discussed results, diagnosis, medications. Discussed red flags for immediate return to clinic/ER, as well as indications for follow up if no improvement. Patient and/or caregiver understood and agreed to plan. Patient was stable for discharge.    SUBJECTIVE    HPI:  Patient presents to walk-in clinic with  dog bite on right wrist that occurred 5 days ago while out of the country in [facility].  Patient denies numbness, tingling, weakness in hand.  Is able to open and close hand normally.  Patient knew the owner of the dog and dog was vaccinated against rabies recently.  Area was okay until this morning when it became warm and erythematous around wound.      Last Tdap was 2010.    Car with his right hand.    Also requesting medication refills of blood pressure medicine.  Does not have a primary care provider at this time because [facility] and [facility] stopped taking his insurance earlier this year.          History obtained from the patient.    No past medical history on file.    Active Ambulatory (Non-Hospital) Problems    Diagnosis   ? Coronary artery disease due to calcified coronary lesion   ? Anxiety   ? Hx of carcinoma in situ of prostate   ? Elevated coronary artery calcium score   ? Benign essential hypertension   ? Lung nodules   ? COPD (chronic obstructive pulmonary disease) (H)   ? Hypercholesterolemia   ? History of smoking   ? Colon polyps   ? Major depressive disorder, recurrent episode, moderate (H)   ? Other and unspecified alcohol dependence, in remission         Social History     Tobacco Use   ? Smoking status: Former Smoker     Types: Cigarettes     Last attempt to quit: 2016     Years since quitting: 3.3   ? Smokeless tobacco: Never Used   Substance Use Topics   ? Alcohol use: Not on file       Review of Systems   Constitutional: Negative for chills and fever.   Musculoskeletal: Negative for arthralgias and joint swelling.   Skin: Positive for wound (1.5 cm x 1.5 cm rt wrist with eschar 50% and open, non draining.  3/4 cm ring of erythema around open area.  ).       OBJECTIVE    Vitals:    05/20/19 0953   BP: 125/79   Pulse: 85   Resp: 12   Temp: 98  F (36.7  C)   TempSrc: Oral   SpO2: 95%   Weight: 184 lb 8 oz (83.7 kg)       Physical Exam   Constitutional: He is oriented to person,  place, and time. He appears well-developed and well-nourished. No distress.   HENT:   Right Ear: External ear normal.   Left Ear: External ear normal.   Eyes: Conjunctivae are normal. Right eye exhibits no discharge. Left eye exhibits no discharge.   Cardiovascular: Intact distal pulses.   Pulmonary/Chest: Effort normal.   Musculoskeletal: Normal range of motion. He exhibits no edema, tenderness or deformity.   Normal range of motion of affected wrist.   Neurological: He is alert and oriented to person, place, and time.   Skin: Skin is warm and dry. Capillary refill takes less than 2 seconds.   1.5 cm x 1.5 cm rt dorsal wrist with eschar 50% and open, non draining.  3/4 cm ring of erythema around open area.   Psychiatric: He has a normal mood and affect. His behavior is normal. Judgment and thought content normal.         Labs:  No results found for this or any previous visit (from the past 240 hour(s)).      Radiology:    No results found.    PATIENT INSTRUCTIONS:   Patient Instructions   Augmentin 1 pill twice daily for 10 days.  Take 1 dose now 1 dose in about 12 hours.  Take probiotics such as yogurt or over-the-counter probiotic with this    Monitor outlined area for expansion.  Okay for mild expansion outside of the lines within the first 24 hours, but after that wound should not be expanding.  Any abrupt increase in area of redness or fever, chills should prompt a visit to the emergency room.    If your wrist itself starts looking red, swollen, or you start having difficulty opening and closing your hand or bending wrist due to pain/stiffness and/or fever develops, please go to the hospital.      Tylenol or ibuprofen for pain.    Blood pressure medicine refills at the [facility] in about 3 days as well as a  wound recheck.    Avoid submerging your hand in dirty water such as swimming or doing dishes.  At least once a day after the first 24 hours, remove the bandage, check for signs of infection, wash  gently with soap and water and dab to dry.  Add bacitracin ointment and new bandage.      Patient Education     Dog Bite  A dog bite can cause a wound deep enough to break the skin. In such cases, the wound is cleaned and sometimes closed. If the wound is closed, it is usually not completely closed. This is so that fluid can drain if the wound becomes infected. Often, wounds will be left open to heal. In addition to wound care, a tetanus shot may be given, if needed.    Home care    Wash your hands well with soap and warm water before and after caring for the wound. This helps lower the risk of infection.    Care for the wound as directed. If a dressing was applied to the wound, be sure to change it as directed.    If the wound bleeds, place a clean, soft cloth on the wound. Then firmly apply pressure until the bleeding stops. This may take up to 5 minutes. Do not release the pressure and look at the wound during this time.    Most wounds heal within 10 days. But an infection can occur even with proper treatment. So be sure to check the wound daily for signs of infection (see below).    Antibiotics may be prescribed. These help prevent or treat infection. If youre given antibiotics, take them as directed. Also be sure to complete the medicines.  Rabies prevention  Rabies is a virus that can be carried in certain animals. These can include domestic animals such as dogs and cats. Pets fully vaccinated against rabies (2 shots) are at very low risk of infection. But because human rabies is almost always fatal, any biting pet should be confined for 10 days as an extra precaution. In general, if there is a risk for rabies, the following steps may need to be taken:    If someones pet dog has bitten you, it should be kept in a secure area for the next 10 days to watch for signs of illness. (If the pet owner wont allow this, contact your local animal control center.) If the dog becomes ill or dies during that time, contact  your local animal control center at once so the animal may be tested for rabies. If the dog stays healthy for the next 10 days, there is no danger of rabies in the animal or you.  ? If a stray dog bit you, contact your local animal control center. They can give information on capture, quarantine, and animal rabies testing.  ? If you cant find the animal that bit you in the next 2 days, and if rabies exists in your area, you may need to receive the rabies vaccine series. Call your healthcare provider right away. Or, return to the emergency department promptly.  ? All animal bites should be reported to the local animal control center. If you were not given a form to fill out, you can report this yourself.  Follow-up care  Follow up with your healthcare provider, or as directed.  When to seek medical advice  Call your healthcare provider right away if any of these occur:    Signs of infection:  ? Spreading redness or warmth from the wound  ? Increased pain or swelling  ? Fever of 100.4 F (38 C) or higher, or as directed by your healthcare provider  ? Colored fluid or pus draining from the wound    Signs of rabies infection:  ? Headache  ? Confusion  ? Strange behavior  ? Increased salivating and drooling  ? Seizure    Decreased ability to move any body part near the wound    Bleeding that can't be stopped after 5 minutes of firm pressure  Date Last Reviewed: 3/1/2017    2204-2189 [employer]. [address] [place] [address], [place] [address]. All rights reserved. This information is not intended as a substitute for professional medical care. Always follow your healthcare professional's instructions.

## 2021-07-14 PROBLEM — R93.1 ELEVATED CORONARY ARTERY CALCIUM SCORE: Status: RESOLVED | Noted: 2018-12-21 | Resolved: 2020-10-08

## 2021-08-06 NOTE — PATIENT INSTRUCTIONS - HE
Patient Instructions by Juhi Garcia MD at 11/16/2020  9:00 AM     Author: Juhi Garcia MD Service: -- Author Type: Physician    Filed: 11/16/2020  9:23 AM Encounter Date: 11/16/2020 Status: Signed    : Juhi Garcia MD (Physician)         Patient Education     Exercise for a Healthier Heart  You may wonder how you can improve the health of your heart. If youre thinking about exercise, youre on the right track. You dont need to become an athlete, but you do need a certain amount of brisk exercise to help strengthen your heart. If you have been diagnosed with a heart condition, your doctor may recommend exercise to help stabilize your condition. To help make exercise a habit, choose safe, fun activities.       Be sure to check with your health care provider before starting an exercise program.    Why exercise?  Exercising regularly offers many healthy rewards. It can help you do all of the following:    Improve your blood cholesterol levels to help prevent further heart trouble    Lower your blood pressure to help prevent a stroke or heart attack    Control diabetes, or reduce your risk of getting this disease    Improve your heart and lung function    Reach and maintain a healthy weight    Make your muscles stronger and more limber so you can stay active    Prevent falls and fractures by slowing the loss of bone mass (osteoporosis)    Manage stress better  Exercise tips  Ease into your routine. Set small goals. Then build on them.  Exercise on most days. Aim for a total of 150 or more minutes of moderate to  vigorous intensity activity each week. Consider 40 minutes, 3 to 4 times a week. For best results, activity should last for 40 minutes on average. It is OK to work up to the 40 minute period over time. Examples of moderate-intensity activity is walking one mile in 15 minutes or 30 to 45 minutes of yard work.  Step up your daily activity level. Along with your exercise program, try being  more active throughout the day. Walk instead of drive. Do more household tasks or yard work.  Choose one or more activities you enjoy. Walking is one of the easiest things you can do. You can also try swimming, riding a bike, or taking an exercise class.  Stop exercising and call your doctor if you:    Have chest pain or feel dizzy or lightheaded    Feel burning, tightness, pressure, or heaviness in your chest, neck, shoulders, back, or arms    Have unusual shortness of breath    Have increased joint or muscle pain    Have palpitations or an irregular heartbeat      5316-7163 [employer]. [address] [place] [address]. All rights reserved. This information is not intended as a substitute for professional medical care. Always follow your healthcare professional's instructions.         Patient Education   Understanding [employer] MyPlate  The USDA (US Department of Agriculture) has guidelines to help you make healthy food choices. These are called MyPlate. MyPlate shows the food groups that make up healthy meals using the image of a place setting. Before you eat, think about the healthiest choices for what to put onto your plate or into your cup or bowl. To learn more about building a healthy plate, visit www.choosemyplate.gov.       The Food Groups    Fruits: Any fruit or 100% fruit juice counts as part of the Fruit Group. Fruits may be fresh, canned, frozen, or dried, and may be whole, cut-up, or pureed. Make half your plate fruits and vegetables.    Vegetables: Any vegetable or 100% vegetable juice counts as a member of the Vegetable Group. Vegetables may be fresh, frozen, canned, or dried. They can be served raw or cooked and may be whole, cut-up, or mashed. Make half your plate fruits and vegetables.     Grains: All foods made from grains are part of the Grains Group. These include wheat, rice, oats, cornmeal, and barley such as bread, pasta, oatmeal, cereal, tortillas, and grits. Grains  should be no more than a quarter of your plate. At least half of your grains should be whole grains.    Protein: This group includes meat, poultry, seafood, beans and peas, eggs, processed soy products (like tofu), nuts (including nut butters), and seeds. Make protein choices no more than a quarter of your plate. Meat and poultry choices should be lean or low fat.    Dairy: All fluid milk products and foods made from milk that contain calcium, like yogurt and cheese are part of the Dairy Group. (Foods that have little calcium, such as cream, butter, and cream cheese, are not part of the group.) Most dairy choices should be low-fat or fat-free.    Oils: These are fats that are liquid at room temperature. They include canola, corn, olive, soybean, and sunflower oil. Foods that are mainly oil include mayonnaise, certain salad dressings, and soft margarines. You should have only 5 to 7 teaspoons of oils a day. You probably already get this much from the food you eat.  Use [employer] to Help Build Your Meals  The SuperTracker can help you plan and track your meals and activity. You can look up individual foods to see or compare their nutritional value. You can get guidelines for what and how much you should eat. You can compare your food choices. And you can assess personal physical activities and see ways you can improve. Go to www.Recommendi.gov/supertracker/.    5782-2770 [employer]. [address] [place] [address]. All rights reserved. This information is not intended as a substitute for professional medical care. Always follow your healthcare professional's instructions.           Patient Education   Signs of Hearing Loss  Hearing loss is a problem shared by many people. In fact, it is one of the most common health conditions, particularly as people age. Most people over age 65 have some hearing loss, and by age 80, almost everyone does. Because hearing loss usually occurs slowly over  the years, you may not realize your hearing ability has gotten worse.       Have your hearing checked  Contact your [facility] care provider if you:    Have to strain to hear normal conversation.    Have to watch other peoples faces very carefully to follow what theyre saying.    Need to ask people to repeat what theyve said.    Often misunderstand what people are saying.    Turn the volume of the television or radio up so high that others complain.    Feel that people are mumbling when theyre talking to you.    Find that the effort to hear leaves you feeling tired and irritated.    Notice, when using the phone, that you hear better with 1 ear than the other.    1630-2543 [employer]. [address] [place] [address], [place] [address]. All rights reserved. This information is not intended as a substitute for professional medical care. Always follow your healthcare professional's instructions.         Patient Education   Your Health Risk Assessment indicates you feel you are not in good emotional health.    Recreation   Recreation is not limited to sports and team events. It includes any activity that provides relaxation, interest, enjoyment, and exercise. Recreation provides an outlet for physical, mental, and social energy. It can give a sense of worth and achievement. It can help you stay healthy.    Mental Exercise and Social Involvement  Mental and emotional health is as important as physical health. Keep in touch with friends and family. Stay as active as possible. Continue to learn and challenge yourself.   Things you can do to stay mentally active are:    Learn something new, like a foreign language or musical instrument.     Play SCRABBLE or do crossword puzzles. If you cannot find people to play these games with you at home, you can play them with others on your computer through the Internet.     Join a games club--anything from card games to chess or checkers or lawn bowling.     Start a new hobby.     Go  back to school.     Volunteer.     Read.     Keep up with world events.       Patient Education   Depression and Suicide in Older Adults  Nearly 2 million older Americans have some type of depression. Sadly, some of them even take their own lives. Yet depression among older adults is often ignored. Learn the warning signs. You may help spare a loved one needless pain. You may also save a life.       What Is Depression?  Depression is a mood disorder that affects the way you think and feel. The most common symptom is a feeling of deep sadness. People who are depressed also may seem tired and listless. And nothing seems to give them pleasure. Its normal to grieve or be sad sometimes. But sadness lessens or passes with time. Depression rarely goes away or improves on its own. Other symptoms of depression are:    Sleeping more or less than normal    Eating more or less than normal    Having headaches, stomachaches, or other pains that dont go away    Feeling nervous, empty, or worthless    Crying a great deal    Thinking or talking about suicide or death    Feeling confused or forgetful  What Causes It?  The causes of depression arent fully known. Certain chemicals in the brain play a role. Depression does run in families. And life stresses can also trigger depression in some people. That may be the case with older adults. They often face great burdens, such as the death of friends or a spouse. They may have failing health. And they are more likely to be alone, lonely, or poor.  How You Can Help  Often, depressed people may not want to ask for help. When they do, they may be ignored. Or, they may receive the wrong treatment. You can help by showing parents and older friends love and support. If they seem depressed, help them find the right treatment. Talk to your doctor. Or contact a local mental health center, social service agency, or hospital. With modern treatment, no one has to suffer from  depression.  Resources:    National [place] of Mental Health  109.225.1235  www.nimh.nih.gov    National [place] on Mental Illness  281.492.8876  www.santos.org    Mental Health [facility]  363.185.7898  www.[facility].org    National Suicide Hotline  385.703.3044 (800-SUICIDE)      6211-0999 The [employer]. [address] [place] [address]. All rights reserved. This information is not intended as a substitute for professional medical care. Always follow your healthcare professional's instructions.         Patient Education   Preventing Falls in the Home  As you get older, falls are more likely. Thats because your reaction time slows. Your muscles and joints may also get stiffer, making them less flexible. Illness, medications, and vision changes can also affect your balance. A fall could leave you unable to live on your own. To make your home safer, follow these tips:    Floors    Put nonskid pads under area rugs.    Remove throw rugs.    Replace worn floor coverings.    Tack carpets firmly to each step on carpeted stairs. Put nonskid strips on the edges of uncarpeted stairs.    Keep floors and stairs free of clutter and cords.    Arrange furniture so there are clear pathways.    Clean up any spills right away.    Bathrooms    Install grab bars in the tub or shower.    Apply nonskid strips or put a nonskid rubber mat in the tub or shower.    Sit on a bath chair to bathe.    Use bathmats with nonskid backing.    Lighting    Keep a flashlight in each room.    Put a nightlight along the pathway between the bedroom and the bathroom.    5707-8671 [employer]. [address] [place], [place] [id number]. All rights reserved. This information is not intended as a substitute for professional medical care. Always follow your healthcare professional's instructions.         Patient Education   Understanding Advance Care Planning  Advance care planning is the process of deciding ones own future medical  care. It helps ensure that if you cant speak for yourself, your wishes can still be carried out. The plan is a series of legal documents that note a persons wishes. The documents vary by state. Advance care planning may be done when a person has a serious illness that is expected to get worse. It may be done before major surgery. And it can help you and your family be prepared in case of a major illness or injury. Advance care planning helps with making decisions at these times.       A health care proxy is a person who acts as the voice of a patient when the patient cant speak for himself or herself. The name of this role varies by state. It may be called a Durable Medical Power of  or Durable Power of  for Healthcare. It may be called an agent, surrogate, or advocate. Or it may be called a representative or decision maker. It is an official duty that is identified by a legal document. The document also varies by state.    Why Is Advance Care Planning Important?  If a person communicates their healthcare wishes:    They will be given medical care that matches their values and goals.    Their family members will not be forced to make decisions in a crisis with no guidance.  Creating a Plan  Making an advance care plan is often done in 3 steps:    Thinking about ones wishes. To create an advance care plan, you should think about what kind of medical treatment you would want if you lose the ability to communicate. Are there any situations in which you would refuse or stop treatment? Are there therapies you would want or not want? And whom do you want to make decisions for you? There are many places to learn more about how to plan for your care. Ask your doctor or  for resources.    Picking a health care proxy. This means choosing a trusted person to speak for you only when you cant speak for yourself. When you cannot make medical decisions, your proxy makes sure the instructions in your  advance care plan are followed. A proxy does not make decisions based on his or her own opinions. They must put aside those opinions and values if needed, and carry out your wishes.    Filling out the legal documents. There are several kinds of legal documents for advance care planning. Each one tells health care providers your wishes. The documents may vary by state. They must be signed and may need to be witnessed or notarized. You can cancel or change them whenever you wish. Depending on your state, the documents may include a Healthcare Proxy form, Living Will, Durable Medical Power of , Advance Directive, or others.  The Familys Role  The best help a family can give is to support their loved ones wishes. Open and honest communication is vital. Family should express any concerns they have about the patients choices while the patient can still make decisions.    1341-9515 The [employer]. [address] [place] [address]. All rights reserved. This information is not intended as a substitute for professional medical care. Always follow your healthcare professional's instructions.         Also, [facility] [employer] [place] offers a free, downloadable health care directive that allows you to share your treatment choices and personal preferences if you cannot communicate your wishes. It also allows you to appoint another person (called a health care agent) to make health care decisions if you are unable to do so. You can download an advance directive by going here: [email or web address]     Patient Education   Personalized Prevention Plan  You are due for the preventive services outlined below.  Your care team is available to assist you in scheduling these services.  If you have already completed any of these items, please share that information with your care team to update in your medical record.  Health Maintenance   Topic Date Due   ? DEPRESSION ACTION PLAN   1945   ? HEPATITIS C SCREENING  1945   ? SPIROMETRY  1945   ? COPD ACTION PLAN  1945   ? ZOSTER VACCINES (1 of 2) 06/01/1996   ? MEDICARE ANNUAL WELLNESS VISIT  11/16/2021   ? FALL RISK ASSESSMENT  11/16/2021   ? COLORECTAL CANCER SCREENING  12/24/2022   ? LIPID  05/30/2024   ? ADVANCE CARE PLANNING  11/16/2025   ? TD 18+ HE  05/20/2029   ? Pneumococcal Vaccine: 65+ Years  Completed   ? INFLUENZA VACCINE RULE BASED  Completed   ? Pneumococcal Vaccine: Pediatrics (0 to 5 Years) and At-Risk Patients (6 to 64 Years)  Aged Out   ? HEPATITIS B VACCINES  Aged Out

## 2021-09-05 ENCOUNTER — HEALTH MAINTENANCE LETTER (OUTPATIENT)
Age: 76
End: 2021-09-05

## 2021-09-23 NOTE — PROGRESS NOTES
Hi Mr. Adams.  Your labs today are normal including your CBC, kidney function, glucose, lipids, PSA, and thyroid function.  Please let me know if you have any questions and I hope you have a good weekend!  BONITA
Vaccine status unknown

## 2021-12-26 ENCOUNTER — HEALTH MAINTENANCE LETTER (OUTPATIENT)
Age: 76
End: 2021-12-26

## 2022-10-23 ENCOUNTER — HEALTH MAINTENANCE LETTER (OUTPATIENT)
Age: 77
End: 2022-10-23

## 2022-12-10 ENCOUNTER — HEALTH MAINTENANCE LETTER (OUTPATIENT)
Age: 77
End: 2022-12-10

## 2024-01-14 ENCOUNTER — HEALTH MAINTENANCE LETTER (OUTPATIENT)
Age: 79
End: 2024-01-14

## 2024-11-26 ENCOUNTER — APPOINTMENT (OUTPATIENT)
Dept: CT IMAGING | Facility: CLINIC | Age: 79
End: 2024-11-26
Attending: EMERGENCY MEDICINE
Payer: COMMERCIAL

## 2024-11-26 ENCOUNTER — HOSPITAL ENCOUNTER (EMERGENCY)
Facility: CLINIC | Age: 79
Discharge: HOME | End: 2024-11-27
Attending: EMERGENCY MEDICINE | Admitting: EMERGENCY MEDICINE
Payer: COMMERCIAL

## 2024-11-26 DIAGNOSIS — R51.9 NONINTRACTABLE EPISODIC HEADACHE, UNSPECIFIED HEADACHE TYPE: ICD-10-CM

## 2024-11-26 DIAGNOSIS — G44.209 TENSION HEADACHE: ICD-10-CM

## 2024-11-26 DIAGNOSIS — R03.0 ELEVATED BP WITHOUT DIAGNOSIS OF HYPERTENSION: ICD-10-CM

## 2024-11-26 LAB
ANION GAP SERPL CALCULATED.3IONS-SCNC: 9 MMOL/L (ref 7–15)
ATRIAL RATE - MUSE: 95 BPM
BASOPHILS # BLD AUTO: 0 10E3/UL (ref 0–0.2)
BASOPHILS NFR BLD AUTO: 1 %
BUN SERPL-MCNC: 18.9 MG/DL (ref 8–23)
CALCIUM SERPL-MCNC: 8.3 MG/DL (ref 8.8–10.4)
CHLORIDE SERPL-SCNC: 100 MMOL/L (ref 98–107)
CREAT SERPL-MCNC: 0.93 MG/DL (ref 0.67–1.17)
DIASTOLIC BLOOD PRESSURE - MUSE: NORMAL MMHG
EGFRCR SERPLBLD CKD-EPI 2021: 84 ML/MIN/1.73M2
EOSINOPHIL # BLD AUTO: 0.1 10E3/UL (ref 0–0.7)
EOSINOPHIL NFR BLD AUTO: 2 %
ERYTHROCYTE [DISTWIDTH] IN BLOOD BY AUTOMATED COUNT: 11.9 % (ref 10–15)
GLUCOSE SERPL-MCNC: 121 MG/DL (ref 70–99)
HCO3 SERPL-SCNC: 27 MMOL/L (ref 22–29)
HCT VFR BLD AUTO: 43.4 % (ref 40–53)
HGB BLD-MCNC: 15 G/DL (ref 13.3–17.7)
IMM GRANULOCYTES # BLD: 0 10E3/UL
IMM GRANULOCYTES NFR BLD: 0 %
INTERPRETATION ECG - MUSE: NORMAL
LYMPHOCYTES # BLD AUTO: 1.1 10E3/UL (ref 0.8–5.3)
LYMPHOCYTES NFR BLD AUTO: 20 %
MCH RBC QN AUTO: 31 PG (ref 26.5–33)
MCHC RBC AUTO-ENTMCNC: 34.6 G/DL (ref 31.5–36.5)
MCV RBC AUTO: 90 FL (ref 78–100)
MONOCYTES # BLD AUTO: 0.6 10E3/UL (ref 0–1.3)
MONOCYTES NFR BLD AUTO: 11 %
NEUTROPHILS # BLD AUTO: 3.5 10E3/UL (ref 1.6–8.3)
NEUTROPHILS NFR BLD AUTO: 67 %
NRBC # BLD AUTO: 0 10E3/UL
NRBC BLD AUTO-RTO: 0 /100
P AXIS - MUSE: 61 DEGREES
PLATELET # BLD AUTO: 221 10E3/UL (ref 150–450)
POTASSIUM SERPL-SCNC: 4 MMOL/L (ref 3.4–5.3)
PR INTERVAL - MUSE: 132 MS
QRS DURATION - MUSE: 118 MS
QT - MUSE: 416 MS
QTC - MUSE: 522 MS
R AXIS - MUSE: 216 DEGREES
RBC # BLD AUTO: 4.84 10E6/UL (ref 4.4–5.9)
SODIUM SERPL-SCNC: 136 MMOL/L (ref 135–145)
SYSTOLIC BLOOD PRESSURE - MUSE: NORMAL MMHG
T AXIS - MUSE: 12 DEGREES
TROPONIN T SERPL HS-MCNC: 13 NG/L
VENTRICULAR RATE- MUSE: 95 BPM
WBC # BLD AUTO: 5.3 10E3/UL (ref 4–11)

## 2024-11-26 PROCEDURE — 36415 COLL VENOUS BLD VENIPUNCTURE: CPT | Performed by: EMERGENCY MEDICINE

## 2024-11-26 PROCEDURE — 70450 CT HEAD/BRAIN W/O DYE: CPT

## 2024-11-26 PROCEDURE — 85048 AUTOMATED LEUKOCYTE COUNT: CPT | Performed by: EMERGENCY MEDICINE

## 2024-11-26 PROCEDURE — 99285 EMERGENCY DEPT VISIT HI MDM: CPT | Mod: 25

## 2024-11-26 PROCEDURE — 85025 COMPLETE CBC W/AUTO DIFF WBC: CPT | Performed by: EMERGENCY MEDICINE

## 2024-11-26 PROCEDURE — 80048 BASIC METABOLIC PNL TOTAL CA: CPT | Performed by: EMERGENCY MEDICINE

## 2024-11-26 PROCEDURE — 93005 ELECTROCARDIOGRAM TRACING: CPT

## 2024-11-26 PROCEDURE — 250N000013 HC RX MED GY IP 250 OP 250 PS 637: Performed by: EMERGENCY MEDICINE

## 2024-11-26 PROCEDURE — 84484 ASSAY OF TROPONIN QUANT: CPT | Performed by: EMERGENCY MEDICINE

## 2024-11-26 RX ORDER — ACETAMINOPHEN 500 MG
1000 TABLET ORAL ONCE
Status: COMPLETED | OUTPATIENT
Start: 2024-11-26 | End: 2024-11-26

## 2024-11-26 RX ADMIN — ACETAMINOPHEN 1000 MG: 500 TABLET, FILM COATED ORAL at 23:18

## 2024-11-26 ASSESSMENT — COLUMBIA-SUICIDE SEVERITY RATING SCALE - C-SSRS
1. IN THE PAST MONTH, HAVE YOU WISHED YOU WERE DEAD OR WISHED YOU COULD GO TO SLEEP AND NOT WAKE UP?: NO
6. HAVE YOU EVER DONE ANYTHING, STARTED TO DO ANYTHING, OR PREPARED TO DO ANYTHING TO END YOUR LIFE?: NO
2. HAVE YOU ACTUALLY HAD ANY THOUGHTS OF KILLING YOURSELF IN THE PAST MONTH?: NO

## 2024-11-26 ASSESSMENT — ACTIVITIES OF DAILY LIVING (ADL): ADLS_ACUITY_SCORE: 41

## 2024-11-27 VITALS
OXYGEN SATURATION: 96 % | BODY MASS INDEX: 24.65 KG/M2 | HEART RATE: 94 BPM | HEIGHT: 72 IN | DIASTOLIC BLOOD PRESSURE: 97 MMHG | TEMPERATURE: 98.1 F | SYSTOLIC BLOOD PRESSURE: 135 MMHG | RESPIRATION RATE: 11 BRPM | WEIGHT: 182 LBS

## 2024-11-27 ASSESSMENT — ACTIVITIES OF DAILY LIVING (ADL)
ADLS_ACUITY_SCORE: 41
ADLS_ACUITY_SCORE: 41

## 2024-11-27 NOTE — ED NOTES
Bed: ED26  Expected date:   Expected time:   Means of arrival:   Comments:  Ravinder 541 here now 79M headache HTN

## 2024-11-27 NOTE — ED TRIAGE NOTES
Come from home by ambulance. Patient reports headache started around 5pm. Pt reports he started to take his BP because of the headache, reported high BPs 200s/100s. Pt reports hx of COPD, hx of PE (on thinners) and wears O2 intermittently overnight. Arrived on 4L O2 by EMS and BPs 160s/115. Patient reports headache is now 3/10 but still there. Patient denies chest pain but does report SOB. Reports hx of anxiety as well. A&Ox4, calm and cooperative.      Triage Assessment (Adult)       Row Name 11/26/24 9200          Triage Assessment    Airway WDL WDL        Respiratory WDL    Respiratory WDL WDL        Cardiac WDL    Cardiac WDL X  HTN on arrival        Peripheral/Neurovascular WDL    Peripheral Neurovascular WDL WDL        Cognitive/Neuro/Behavioral WDL    Cognitive/Neuro/Behavioral WDL WDL

## 2024-11-27 NOTE — ED NOTES
Pt resting comfortably. Updated on plan to redraw troponin at 0100. Pt denies chest pain or SOB. O2 turned down to 2L NC.

## 2024-11-27 NOTE — ED PROVIDER NOTES
Emergency Department Note      History of Present Illness     Chief Complaint   Headache and Hypertension      HPI   Omar Adams is a 79 year old male on 10 MG of Xarelto with a history of COPD, CAD, pulmonary embolism, hypertension, and hypercholesterolemia who presents to the ED for evaluation of an anterior headache and hypertension. The patient reports that they were at home and noticed a high blood pressure after checking due to a headache that started around 1800, prompting today's visit. He notes that pain associated with the headache is now at about a 20% of its baseline, which he states is normally rated about a 6/10. He reports that he normally checks his blood pressure twice a day, and had a similar episode happen previously about two weeks ago when he visited the Urgent Care. He endorses shortness of breath, but notes that he has COPD with 2 liters of oxygen that he does not normally use. He denies any chest pain, abdominal pain, nausea, vomiting, diplopia, or loss of sensation. Patient adds that he previously had a pulmonary embolism and is now on 10 MG of Xarelto.    Independent Historian   None    Review of External Notes   I reviewed patient's cardiology visit from 7/9/24.  Patient with a history of pulmonary embolism, CAD, elevated blood pressure and prostate cancer.  Patient is currently on Xarelto indefinitely given history of pulmonary embolism.  Prior echocardiogram from March 2023.  Patient currently on statin medication for hyperlipidemia.  Blood pressure well-controlled without medications.    Past Medical History     Medical History and Problem List   COPD  Proximal humerus fracture  Age-related osteoporosis without current pathological fracture  Chronic midline low back pain without sciatica  Depression, recurrent  Recurrent vertebral fractures  Claudication  Pulmonary embolus  PTSD  RBBB  Anxiety  Hypertension  Colon polyps  CAD due to calcified coronary lesion  Carcinoma in situ  of prostate  Hypercholesterolemia  Lung nodules    Medications   Xarelto  Aspirin 81 MG  Celexa  Duoneb  Pravachol  Stiolto Respimat  Norvasc  Symbicort  Remeron    Surgical History   Prostatectomy  Lithotripsy  Colonoscopy diagnostic x2  Fracture surgery, left    Physical Exam     Patient Vitals for the past 24 hrs:   BP Temp Temp src Pulse Resp SpO2 Height Weight   11/27/24 0007 -- -- -- 92 17 98 % -- --   11/27/24 0000 (!) 154/98 -- -- 92 14 99 % -- --   11/26/24 2310 -- -- -- 93 18 99 % -- --   11/26/24 2304 (!) 156/103 -- -- 96 10 99 % -- --   11/26/24 2258 -- 98.1  F (36.7  C) Oral -- -- 99 % -- --   11/26/24 2257 -- -- -- -- -- -- 1.829 m (6') 82.6 kg (182 lb)   11/26/24 2252 (!) 165/113 -- -- 96 20 -- -- --     Physical Exam  General: Alert, appears well-developed and well-nourished. Cooperative.     In mild distress  HEENT:  Head:  Atraumatic  Ears:  External ears are normal  Mouth/Throat:  Oropharynx is without erythema or exudate and mucous membranes are moist.   Eyes:   Conjunctivae normal and EOM are normal. No scleral icterus.    Pupils are equal, round, and reactive to light.   CV:  Normal rate, regular rhythm, normal heart sounds and radial pulses are 2+ and symmetric.  No murmur.  Resp:  Breath sounds are clear bilaterally    Non-labored, no retractions or accessory muscle use  GI:  Abdomen is soft, no distension, no tenderness. No rebound or guarding.  No CVA tenderness bilaterally  MS:  Normal range of motion. No edema.    Normal strength in all 4 extremities.     Back atraumatic.    No midline cervical, thoracic, or lumbar tenderness  Skin:  Warm and dry.  No rash or lesions noted.  Neuro:   Alert. Normal strength.  Sensation intact in all 4 extremities. GCS: 15    Cranial nerves 2-12 intact.  Psych: Normal mood and affect.    Diagnostics     Lab Results   Labs Ordered and Resulted from Time of ED Arrival to Time of ED Departure   BASIC METABOLIC PANEL - Abnormal       Result Value    Sodium 136       Potassium 4.0      Chloride 100      Carbon Dioxide (CO2) 27      Anion Gap 9      Urea Nitrogen 18.9      Creatinine 0.93      GFR Estimate 84      Calcium 8.3 (*)     Glucose 121 (*)    TROPONIN T, HIGH SENSITIVITY - Normal    Troponin T, High Sensitivity 13     CBC WITH PLATELETS AND DIFFERENTIAL    WBC Count 5.3      RBC Count 4.84      Hemoglobin 15.0      Hematocrit 43.4      MCV 90      MCH 31.0      MCHC 34.6      RDW 11.9      Platelet Count 221      % Neutrophils 67      % Lymphocytes 20      % Monocytes 11      % Eosinophils 2      % Basophils 1      % Immature Granulocytes 0      NRBCs per 100 WBC 0      Absolute Neutrophils 3.5      Absolute Lymphocytes 1.1      Absolute Monocytes 0.6      Absolute Eosinophils 0.1      Absolute Basophils 0.0      Absolute Immature Granulocytes 0.0      Absolute NRBCs 0.0     TROPONIN T, HIGH SENSITIVITY       Imaging   CT Head w/o Contrast   Final Result   IMPRESSION:   1.  No CT evidence for acute intracranial process.   2.  Brain atrophy and presumed chronic microvascular ischemic changes as above.          EKG   ECG taken at 2339, ECG read at 2351  Normal sinus rhythm  Indeterminate axis  Right bundle branch block  Abnormal ECG   No change as compared to prior, dated 9/28/20.  Rate 95 bpm. DE interval 132 ms. QRS duration 118 ms. QT/QTc 416/522 ms. P-R-T axes 61 216 12.    Independent Interpretation   None    ED Course      Medications Administered   Medications   acetaminophen (TYLENOL) tablet 1,000 mg (1,000 mg Oral $Given 11/26/24 6564)       Procedures   Procedures     Discussion of Management   None    ED Course   ED Course as of 11/27/24 0030   Tue Nov 26, 2024   2250 I obtained history and examined the patient as noted above     Wed Nov 27, 2024   0023 I rechecked the patient and explained findings.       Additional Documentation  None    Medical Decision Making / Diagnosis     CMS Diagnoses: None    MIPS       None    [facility]   Omar Adams is a 79 year  old male who presents for evaluation of elevated blood pressure and mild intermittent frontal headache.  There is a history of hypertension in the past but is not on medications, as was managed with lifestyle modifications.  The workup here is negative and the patient does not have any clinical, laboratory, ecg or historical signs of end-organ dysfunction.  There is no signs of hypertensive emergency or urgency.  Supportive outpatient management is therefore indicated with close follow-up of primary care physician. Patient with no chest pain or SOB symptoms.  EKG non ischemic and trop wnl, low suspicion for ACS.  Given data obtained here in ED, will defer to outpatient primary care follow-up for cardiology follow-up to determine if patient may want to start on a antihypertensive regimen.  He historically has not required blood pressure medication and so I think would be best discussed with his chronic providers whether they should initiate antihypertensive medication.  His blood pressure has trended back to a normal blood pressure without intervention tonight.  His headache was improved with Tylenol.  No sinister pathologies identified this evening.  After all questions answered and return precautions understood, discharged home.     Disposition   The patient was discharged.     Diagnosis     ICD-10-CM    1. Nonintractable episodic headache, unspecified headache type  R51.9       2. Tension headache  G44.209       3. Elevated BP without diagnosis of hypertension  R03.0            Discharge Medications   New Prescriptions    No medications on file         Scribe Disclosure:  I, Zander Slaetr, am serving as a scribe at 11:12 PM on 11/26/2024 to document services personally performed by Brian Gr MD based on my observations and the provider's statements to me.        Brian Gr MD  11/27/24 0031

## 2025-04-19 ENCOUNTER — APPOINTMENT (OUTPATIENT)
Dept: CT IMAGING | Facility: CLINIC | Age: 80
DRG: 896 | End: 2025-04-19
Attending: EMERGENCY MEDICINE
Payer: COMMERCIAL

## 2025-04-19 ENCOUNTER — APPOINTMENT (OUTPATIENT)
Dept: GENERAL RADIOLOGY | Facility: CLINIC | Age: 80
DRG: 896 | End: 2025-04-19
Attending: EMERGENCY MEDICINE
Payer: COMMERCIAL

## 2025-04-19 ENCOUNTER — HOSPITAL ENCOUNTER (INPATIENT)
Facility: CLINIC | Age: 80
LOS: 3 days | Discharge: HOME HEALTH | DRG: 896 | End: 2025-04-22
Attending: EMERGENCY MEDICINE | Admitting: INTERNAL MEDICINE
Payer: COMMERCIAL

## 2025-04-19 DIAGNOSIS — F10.90 ALCOHOL USE DISORDER: ICD-10-CM

## 2025-04-19 DIAGNOSIS — I5A MYOCARDIAL INJURY: ICD-10-CM
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
## 2025-04-19 DIAGNOSIS — F33.1 MAJOR DEPRESSIVE DISORDER, RECURRENT EPISODE, MODERATE (H): ICD-10-CM

## 2025-04-19 DIAGNOSIS — J18.9 PNEUMONIA OF BOTH LOWER LOBES DUE TO INFECTIOUS ORGANISM: ICD-10-CM

## 2025-04-19 DIAGNOSIS — S51.019A SKIN TEAR OF ELBOW WITHOUT COMPLICATION, UNSPECIFIED LATERALITY, INITIAL ENCOUNTER: ICD-10-CM

## 2025-04-19 DIAGNOSIS — R60.0 PERIPHERAL EDEMA: ICD-10-CM

## 2025-04-19 DIAGNOSIS — J44.1 COPD WITH ACUTE EXACERBATION (H): ICD-10-CM

## 2025-04-19 DIAGNOSIS — E88.89 KETOSIS (H): ICD-10-CM

## 2025-04-19 DIAGNOSIS — F10.20 ALCOHOL USE DISORDER, MODERATE, DEPENDENCE (H): Primary | Chronic | ICD-10-CM

## 2025-04-19 DIAGNOSIS — F41.9 ANXIETY: ICD-10-CM

## 2025-04-19 LAB
ALBUMIN SERPL BCG-MCNC: 4.1 G/DL (ref 3.5–5.2)
ALP SERPL-CCNC: 54 U/L (ref 40–150)
ALT SERPL W P-5'-P-CCNC: 21 U/L (ref 0–70)
ANION GAP SERPL CALCULATED.3IONS-SCNC: 12 MMOL/L (ref 7–15)
AST SERPL W P-5'-P-CCNC: 36 U/L (ref 0–45)
AST SERPL W P-5'-P-CCNC: NORMAL U/L
B-OH-BUTYR SERPL-SCNC: 0.53 MMOL/L
B-OH-BUTYR SERPL-SCNC: NORMAL MMOL/L
BASOPHILS # BLD AUTO: 0 10E3/UL (ref 0–0.2)
BASOPHILS NFR BLD AUTO: 1 %
BILIRUB DIRECT SERPL-MCNC: <0.08 MG/DL (ref 0–0.3)
BILIRUB SERPL-MCNC: 0.5 MG/DL
BUN SERPL-MCNC: 30 MG/DL (ref 8–23)
CALCIUM SERPL-MCNC: 8.5 MG/DL (ref 8.8–10.4)
CHLORIDE SERPL-SCNC: 101 MMOL/L (ref 98–107)
CREAT SERPL-MCNC: 1.05 MG/DL (ref 0.67–1.17)
D DIMER PPP FEU-MCNC: 0.69 UG/ML FEU (ref 0–0.5)
EGFRCR SERPLBLD CKD-EPI 2021: 73 ML/MIN/1.73M2
EOSINOPHIL # BLD AUTO: 0.1 10E3/UL (ref 0–0.7)
EOSINOPHIL NFR BLD AUTO: 3 %
ERYTHROCYTE [DISTWIDTH] IN BLOOD BY AUTOMATED COUNT: 14.2 % (ref 10–15)
ETHANOL SERPL-MCNC: 0.13 G/DL
ETHANOL SERPL-MCNC: 0.19 G/DL
FLUAV RNA SPEC QL NAA+PROBE: NEGATIVE
FLUBV RNA RESP QL NAA+PROBE: NEGATIVE
GLUCOSE SERPL-MCNC: 97 MG/DL (ref 70–99)
HCO3 BLDV-SCNC: 31 MMOL/L (ref 21–28)
HCO3 SERPL-SCNC: 26 MMOL/L (ref 22–29)
HCT VFR BLD AUTO: 43.6 % (ref 40–53)
HGB BLD-MCNC: 14.7 G/DL (ref 13.3–17.7)
IMM GRANULOCYTES # BLD: 0 10E3/UL
IMM GRANULOCYTES NFR BLD: 1 %
LACTATE BLD-SCNC: 3 MMOL/L (ref 0.7–2)
LACTATE SERPL-SCNC: 3 MMOL/L (ref 0.7–2)
LYMPHOCYTES # BLD AUTO: 1.2 10E3/UL (ref 0.8–5.3)
LYMPHOCYTES NFR BLD AUTO: 27 %
MAGNESIUM SERPL-MCNC: 1.9 MG/DL (ref 1.7–2.3)
MCH RBC QN AUTO: 31.1 PG (ref 26.5–33)
MCHC RBC AUTO-ENTMCNC: 33.7 G/DL (ref 31.5–36.5)
MCV RBC AUTO: 92 FL (ref 78–100)
MONOCYTES # BLD AUTO: 0.4 10E3/UL (ref 0–1.3)
MONOCYTES NFR BLD AUTO: 8 %
NEUTROPHILS # BLD AUTO: 2.6 10E3/UL (ref 1.6–8.3)
NEUTROPHILS NFR BLD AUTO: 61 %
NRBC # BLD AUTO: 0 10E3/UL
NRBC BLD AUTO-RTO: 0 /100
NT-PROBNP SERPL-MCNC: 436 PG/ML (ref 0–1800)
PCO2 BLDV: 60 MM HG (ref 40–50)
PH BLDV: 7.32 [PH] (ref 7.32–7.43)
PLATELET # BLD AUTO: 194 10E3/UL (ref 150–450)
PO2 BLDV: 26 MM HG (ref 25–47)
POTASSIUM SERPL-SCNC: 4.6 MMOL/L (ref 3.4–5.3)
POTASSIUM SERPL-SCNC: 5.6 MMOL/L (ref 3.4–5.3)
PROT SERPL-MCNC: 6.6 G/DL (ref 6.4–8.3)
RBC # BLD AUTO: 4.73 10E6/UL (ref 4.4–5.9)
RSV RNA SPEC NAA+PROBE: NEGATIVE
SAO2 % BLDV: 42 % (ref 70–75)
SARS-COV-2 RNA RESP QL NAA+PROBE: NEGATIVE
SODIUM SERPL-SCNC: 139 MMOL/L (ref 135–145)
TROPONIN T SERPL HS-MCNC: 28 NG/L
TROPONIN T SERPL HS-MCNC: NORMAL NG/L
WBC # BLD AUTO: 4.3 10E3/UL (ref 4–11)

## 2025-04-19 PROCEDURE — 250N000011 HC RX IP 250 OP 636: Performed by: EMERGENCY MEDICINE

## 2025-04-19 PROCEDURE — 73080 X-RAY EXAM OF ELBOW: CPT | Mod: 50

## 2025-04-19 PROCEDURE — 84100 ASSAY OF PHOSPHORUS: CPT | Performed by: INTERNAL MEDICINE

## 2025-04-19 PROCEDURE — 84132 ASSAY OF SERUM POTASSIUM: CPT | Performed by: EMERGENCY MEDICINE

## 2025-04-19 PROCEDURE — 36415 COLL VENOUS BLD VENIPUNCTURE: CPT | Performed by: EMERGENCY MEDICINE

## 2025-04-19 PROCEDURE — HZ2ZZZZ DETOXIFICATION SERVICES FOR SUBSTANCE ABUSE TREATMENT: ICD-10-PCS | Performed by: INTERNAL MEDICINE

## 2025-04-19 PROCEDURE — 84155 ASSAY OF PROTEIN SERUM: CPT | Performed by: EMERGENCY MEDICINE

## 2025-04-19 PROCEDURE — 94640 AIRWAY INHALATION TREATMENT: CPT

## 2025-04-19 PROCEDURE — 70450 CT HEAD/BRAIN W/O DYE: CPT

## 2025-04-19 PROCEDURE — 85004 AUTOMATED DIFF WBC COUNT: CPT | Performed by: EMERGENCY MEDICINE

## 2025-04-19 PROCEDURE — 99222 1ST HOSP IP/OBS MODERATE 55: CPT | Performed by: INTERNAL MEDICINE

## 2025-04-19 PROCEDURE — 85379 FIBRIN DEGRADATION QUANT: CPT | Performed by: EMERGENCY MEDICINE

## 2025-04-19 PROCEDURE — 96361 HYDRATE IV INFUSION ADD-ON: CPT | Mod: 59

## 2025-04-19 PROCEDURE — 96375 TX/PRO/DX INJ NEW DRUG ADDON: CPT | Mod: 59

## 2025-04-19 PROCEDURE — 120N000001 HC R&B MED SURG/OB

## 2025-04-19 PROCEDURE — 82010 KETONE BODYS QUAN: CPT | Performed by: EMERGENCY MEDICINE

## 2025-04-19 PROCEDURE — 84484 ASSAY OF TROPONIN QUANT: CPT | Performed by: EMERGENCY MEDICINE

## 2025-04-19 PROCEDURE — 84145 PROCALCITONIN (PCT): CPT | Performed by: INTERNAL MEDICINE

## 2025-04-19 PROCEDURE — 82077 ASSAY SPEC XCP UR&BREATH IA: CPT | Performed by: INTERNAL MEDICINE

## 2025-04-19 PROCEDURE — 87637 SARSCOV2&INF A&B&RSV AMP PRB: CPT | Performed by: EMERGENCY MEDICINE

## 2025-04-19 PROCEDURE — 250N000013 HC RX MED GY IP 250 OP 250 PS 637: Performed by: EMERGENCY MEDICINE

## 2025-04-19 PROCEDURE — 83605 ASSAY OF LACTIC ACID: CPT | Performed by: EMERGENCY MEDICINE

## 2025-04-19 PROCEDURE — 96365 THER/PROPH/DIAG IV INF INIT: CPT | Mod: 59

## 2025-04-19 PROCEDURE — 80048 BASIC METABOLIC PNL TOTAL CA: CPT | Performed by: EMERGENCY MEDICINE

## 2025-04-19 PROCEDURE — 93005 ELECTROCARDIOGRAM TRACING: CPT

## 2025-04-19 PROCEDURE — 83605 ASSAY OF LACTIC ACID: CPT

## 2025-04-19 PROCEDURE — 83735 ASSAY OF MAGNESIUM: CPT | Performed by: EMERGENCY MEDICINE

## 2025-04-19 PROCEDURE — 99285 EMERGENCY DEPT VISIT HI MDM: CPT | Mod: 25

## 2025-04-19 PROCEDURE — 82077 ASSAY SPEC XCP UR&BREATH IA: CPT | Performed by: EMERGENCY MEDICINE

## 2025-04-19 PROCEDURE — 83880 ASSAY OF NATRIURETIC PEPTIDE: CPT | Performed by: EMERGENCY MEDICINE

## 2025-04-19 PROCEDURE — 83735 ASSAY OF MAGNESIUM: CPT | Performed by: INTERNAL MEDICINE

## 2025-04-19 PROCEDURE — 250N000009 HC RX 250: Performed by: EMERGENCY MEDICINE

## 2025-04-19 PROCEDURE — 258N000003 HC RX IP 258 OP 636: Performed by: EMERGENCY MEDICINE

## 2025-04-19 PROCEDURE — 74177 CT ABD & PELVIS W/CONTRAST: CPT

## 2025-04-19 RX ORDER — DOXYCYCLINE 100 MG/10ML
100 INJECTION, POWDER, LYOPHILIZED, FOR SOLUTION INTRAVENOUS EVERY 12 HOURS
Status: DISCONTINUED | OUTPATIENT
Start: 2025-04-19 | End: 2025-04-20

## 2025-04-19 RX ORDER — DEXTROSE MONOHYDRATE AND SODIUM CHLORIDE 5; .9 G/100ML; G/100ML
INJECTION, SOLUTION INTRAVENOUS CONTINUOUS
Status: DISCONTINUED | OUTPATIENT
Start: 2025-04-19 | End: 2025-04-20

## 2025-04-19 RX ORDER — METHYLPREDNISOLONE SODIUM SUCCINATE 125 MG/2ML
125 INJECTION INTRAMUSCULAR; INTRAVENOUS ONCE
Status: COMPLETED | OUTPATIENT
Start: 2025-04-19 | End: 2025-04-19

## 2025-04-19 RX ORDER — CEFTRIAXONE 2 G/1
2 INJECTION, POWDER, FOR SOLUTION INTRAMUSCULAR; INTRAVENOUS ONCE
Status: COMPLETED | OUTPATIENT
Start: 2025-04-19 | End: 2025-04-19

## 2025-04-19 RX ORDER — MAGNESIUM SULFATE HEPTAHYDRATE 40 MG/ML
2 INJECTION, SOLUTION INTRAVENOUS ONCE
Status: COMPLETED | OUTPATIENT
Start: 2025-04-19 | End: 2025-04-20

## 2025-04-19 RX ORDER — FOLIC ACID 1 MG/1
1 TABLET ORAL ONCE
Status: COMPLETED | OUTPATIENT
Start: 2025-04-19 | End: 2025-04-19

## 2025-04-19 RX ORDER — ALBUTEROL SULFATE 90 UG/1
4 INHALANT RESPIRATORY (INHALATION) ONCE
Status: COMPLETED | OUTPATIENT
Start: 2025-04-19 | End: 2025-04-19

## 2025-04-19 RX ORDER — IOPAMIDOL 755 MG/ML
500 INJECTION, SOLUTION INTRAVASCULAR ONCE
Status: COMPLETED | OUTPATIENT
Start: 2025-04-19 | End: 2025-04-19

## 2025-04-19 RX ADMIN — DEXTROSE AND SODIUM CHLORIDE: 5; 900 INJECTION, SOLUTION INTRAVENOUS at 23:03

## 2025-04-19 RX ADMIN — THIAMINE HCL TAB 100 MG 100 MG: 100 TAB at 23:02

## 2025-04-19 RX ADMIN — METHYLPREDNISOLONE SODIUM SUCCINATE 125 MG: 125 INJECTION, POWDER, FOR SOLUTION INTRAMUSCULAR; INTRAVENOUS at 18:14

## 2025-04-19 RX ADMIN — SODIUM CHLORIDE 500 ML: 0.9 INJECTION, SOLUTION INTRAVENOUS at 21:34

## 2025-04-19 RX ADMIN — IOPAMIDOL 92 ML: 755 INJECTION, SOLUTION INTRAVENOUS at 21:00

## 2025-04-19 RX ADMIN — CEFTRIAXONE 2 G: 2 INJECTION, POWDER, FOR SOLUTION INTRAMUSCULAR; INTRAVENOUS at 22:04

## 2025-04-19 RX ADMIN — ALBUTEROL SULFATE 4 PUFF: 90 AEROSOL, METERED RESPIRATORY (INHALATION) at 18:12

## 2025-04-19 RX ADMIN — FOLIC ACID 1 MG: 1 TABLET ORAL at 23:02

## 2025-04-19 RX ADMIN — SODIUM CHLORIDE 93 ML: 9 INJECTION, SOLUTION INTRAVENOUS at 21:00

## 2025-04-19 RX ADMIN — SODIUM CHLORIDE 1000 ML: 0.9 INJECTION, SOLUTION INTRAVENOUS at 18:16

## 2025-04-19 RX ADMIN — DOXYCYCLINE 100 MG: 100 INJECTION, POWDER, LYOPHILIZED, FOR SOLUTION INTRAVENOUS at 22:44

## 2025-04-19 ASSESSMENT — ACTIVITIES OF DAILY LIVING (ADL)
ADLS_ACUITY_SCORE: 41

## 2025-04-19 ASSESSMENT — COLUMBIA-SUICIDE SEVERITY RATING SCALE - C-SSRS
6. HAVE YOU EVER DONE ANYTHING, STARTED TO DO ANYTHING, OR PREPARED TO DO ANYTHING TO END YOUR LIFE?: NO
2. HAVE YOU ACTUALLY HAD ANY THOUGHTS OF KILLING YOURSELF IN THE PAST MONTH?: NO
1. IN THE PAST MONTH, HAVE YOU WISHED YOU WERE DEAD OR WISHED YOU COULD GO TO SLEEP AND NOT WAKE UP?: NO

## 2025-04-19 NOTE — ED TRIAGE NOTES
Occupational Therapy    Visit Type: treatment    Relevant History/Co-morbidities: PMH of COPD, CKD, HTN, DM2, chronic pain    S/P Laparoscopic Cholecystectomy with Intra-Operative Cholangiogram 4/17, re-eval 4/18    SUBJECTIVE  Patient agreed to participate in therapy this date.  RN in agreement to work with patient for therapy session.  Pt in chair upon therapist arrival, agreeable to OT session.    \"Will my insurance cover portable O2?\" Question noted, seeking SW consult for pt to discuss concerns with.     Session limited due to pt receiving breakfast at start of session and wanting to eat before food gets cold.  Patient / Family Goal: return home    Pain   Patient reports pain is not an issue/concern.    Location: no abdominal pain    OBJECTIVE     Cognitive Status   Level of Consciousness   - alert  Arousal Alertness   - appropriate responses to stimuli  Affect/Behavior    - cooperative and pleasant  Orientation    - Oriented to: appropriate for developmental age  Functional Communication   - Overall Status: within functional limits  Attention Span    - Attention: intact  Following Direction   - follows all commands and directions consistently  Memory   - intact  Safety Awareness/Insight   - intact  Awareness of Deficits   - fully aware of deficits    Vitals:  Pt on 3L at start of session, 88% O2 at rest. Bumped to 3.5L as pt to ambulate. Following activity, pt dropped to 85%, recovery to 89% with seated rest break. RN alerted of change in O2 and current pulse ox.   Pt declines feeling SOB in session, reports she only feels SOB when she coughs.     Patient Activity Tolerance: 1 to 2 activity to rest       Standing Balance  (KASSANDRA = base of support)  Firm Surface: Double Leg      - Static, Eyes Open       - Trial 1 details: supervision, with single UE support and with double UE support     - Dynamic, Eyes Open       - Trial 1 details: supervision, reaches forward, without UE support, with single UE support,  PT BIBA from home. EMS called by family. Initially on scene EMS reports that pt hypotensive 85/52 and altered via ETOH per family. BP leveled out in ambulance to 110's/70's. Pt on 3L on home on home O2 at baseline due to COPD. Pt Says he fell at [phone] but did not hit head or lose consciousness. Fall was unwitnessed and from bed height. Pt has elbow skin tears bilaterally.     Triage Assessment (Adult)       Row Name 04/19/25 4076          Triage Assessment    Airway WDL WDL        Respiratory WDL    Respiratory WDL WDL        Skin Circulation/Temperature WDL    Skin Circulation/Temperature WDL X  bilat Elbow skin tears        Cognitive/Neuro/Behavioral WDL    Cognitive/Neuro/Behavioral WDL X  ETOH     Level of Consciousness alert     Orientation oriented x 4                      reaches with one hand and reaches with 2 hands       Bed Mobility  In chair upon arrival.   Transfers  Assistive devices: gait belt, 2-wheeled walker  - Sit to stand: supervision  - Stand to sit: supervision  Supervision for safety and assist to manage lines.      Functional Ambulation  - Assistance: supervision  - Assistive device: gait belt and 2-wheeled walker  - Distance (ft):8; 8  - Surface: even  Supervision for safety and assist to manage lines, no LOB noted. Pt able to navigate bathroom space with no safety concerns.   Activities of Daily Living (ADLs)  Grooming/Oral Hygiene:        - Oral hygiene assist: supervision  - Position: standing at sink  - Assist needed for: supervision/safety, standing with assistive device and teeth care  Pt declines washing up, agreeable to complete oral cares (mouthwash).   Verbal cues for diaphragmatic breathing throughout session.  Interventions    Treatment provided: activity tolerance, ADL training, energy conservation, functional ambulation, safety training and transfer training  Skilled input: verbal instruction/cues and tactile instruction/cues  Verbal Consent: Writer verbally educated and received verbal consent for hand placement, positioning of patient, and techniques to be performed today from patient for clothing adjustments for techniques and therapist position for techniques as described above and how they are pertinent to the patient's plan of care.         Education:   - Present and ready to learn: patient  Education provided during session:  - Results of above outlined education: Verbalizes understanding, Demonstrates understanding and Needs reinforcement    ASSESSMENT   Progress: progressing toward goals  Interferring components: decreased activity tolerance    Discharge needs based on today's assessment:  - Current level of function: slightly below baseline level of function  - Therapy needs at discharge: therapy 1-3 times per week  - Activities of daily  living (ADLs) requiring support at discharge: grooming and bathing  - Instrumental activities of daily living (IADLs) requiring support at discharge: home management, community mobility and meal preparation  - Impairments that require further therapy intervention: activity tolerance  AM-PAC  - Prior Level of Function: IND/MOD I ([facility] 22-24)       Key: MOD A=moderate assistance, IND/MOD I=independent/modified independent  - Generalized Current Level of Function     - Current Self-Cares: 23       Scoring Key= >21 Modified Independent; 20-21 Supervision; 18-19 Minimal assist; 13-17 Moderate assist; 9-12 Max assist; <9 Total assist      PLAN (while hospitalized)  Suggestions for next session as indicated: Progress activity tolerance, bathing    OT Frequency: 6-7 x per week      PT/OT Mobility Equipment for Discharge: owns cane, 4ww  PT/OT ADL Equipment for Discharge: has grab bars, may benefit from shower chair  Agreement to plan and goals: patient agrees with goals and treatment plan      GOALS  Review Date: 4/25/2024  Long Term Goals: (to be met by time of discharge from hospital)  Grooming: Patient will complete grooming tasks in standing modified independent.  Upper body dressing: Patient will complete upper body dressing modified independent.  Lower body dressing: Patient will complete lower body dressing modified independent.  Toileting: Patient will complete toileting modified independent.  Bathing: Patient will complete bathingmodified independent Toilet transfer: Patient will complete toilet transfer with modified independent.   Home setting transfer: Patient will complete home setting transfers with modified independent.     Documented in the chart in the following areas: Assessment/Plan.    Patient at End of Session:   Location: in chair  Safety measures: alarm system in place/re-engaged and call light within reach  Handoff to: nurse      Therapy procedure time and total treatment time can be found  documented on the Time Entry flowsheet

## 2025-04-19 NOTE — ED NOTES
[facility]  Expected date:   Expected time:   Means of arrival:   Comments:  Ravinder [phone] [id number], ETOH, altered

## 2025-04-19 NOTE — ED PROVIDER NOTES
Emergency Department Note      History of Present Illness     Chief Complaint   Alcohol Intoxication    HPI   Omar Adams is a 79 year old male with a history of PE on Xarelto presenting with alcohol intoxication. The patient reports he is in the ED because his daughter and other family members are concerned about him. Shaq's family reported to EMS that he had been drinking, and the patient notes having two shots of vodka around 0500. The patient is chronically on 3 liters of O2 at home secondary to COPD, but denies supplemental oxygen use upon examination. The patient also previously stated he experienced a fall around [id number] this morning with no head trauma or loss of consciousness, but denies falling recently upon examination. Shaq reports returning from [facility] on 4 days ago (4/15/25). He endorses shortness of breath, which he was experiencing in [facility]. The patient was admitted to the hospital for four days in [facility], and prescribed oral antibiotics for 10 days. He endorses fever, but denies vomiting, diarrhea or urinary symptoms. No ill contacts. Of note, the patient has new redness and swelling in his feet as pictured below. Patient's daughter reports he heavily drinks alcohol daily.     Independent Historian   None    Review of External Notes   None    Past Medical History     Medical History and Problem List   COPD  Proximal humerus fracture  Age-related osteoporosis without current pathological fracture  Chronic midline low back pain without sciatica  Depression, recurrent  Recurrent vertebral fractures  Claudication  Pulmonary embolus  PTSD  RBBB  Anxiety  Hypertension  Colon polyps  CAD due to calcified coronary lesion  Carcinoma in situ of prostate  Hypercholesterolemia  Lung nodules     Medications   Xarelto  Aspirin 81 MG  Celexa  Duoneb  Pravachol  Stiolto Respimat  Norvasc  Symbicort  Remeron     Surgical History   Prostatectomy  Lithotripsy  Colonoscopy diagnostic x2  Fracture  surgery, left    Physical Exam     Patient Vitals for the past 24 hrs:   BP Temp src Pulse Resp SpO2   04/19/25 1900 (!) 142/93 -- 91 15 --   04/19/25 1845 133/90 -- 90 14 98 %   04/19/25 1830 131/79 -- 96 19 --   04/19/25 1815 129/76 -- 90 19 (!) 88 %   04/19/25 1800 -- -- 87 14 98 %   04/19/25 1658 137/88 Oral 94 20 97 %     Physical Exam    HENT:  mmm, no rhinorrhea, AT, NC  Eyes: periorbital tissues normal , pupils equal   Neck: supple, no abnormal swelling, painless range of motion  Lungs:  CTAB,  no resp distress  CV: rrr, no m/r/g, ppi  Abd: soft, nontender, nondistended, no rebound/masses/guarding/hsm  Ext: Superficial skin tear lateral olecranon bilaterally, no surrounding erythema, no significant active bleeding, no joint effusion, range of motion intact, remainder of skeletal survey is unremarkable for significant bony tenderness.    Significant bilateral lower extremity edema distal tib-fib/feet bilaterally left greater than right with hyperemia of the left foot.  The feet are warm and symmetric in temperature, pulses are easily palpable PT and DP.      Skin: warm, dry, well perfused,  Neuro: Awake, alert, slightly slurred speech, face symmetric, 5 out of 5 motor bilateral upper extremities, 5 out of 5 plantarflexion dorsiflexion, able to lift each leg off the bed independently.  Sensation intact to light touch all extremities.  Psych: Normal mood, normal affect          Diagnostics     Lab Results   Labs Ordered and Resulted from Time of ED Arrival to Time of ED Departure   D DIMER QUANTITATIVE - Abnormal       Result Value    D-Dimer Quantitative 0.69 (*)    BASIC METABOLIC PANEL - Abnormal    Sodium 139      Potassium 5.6 (*)     Chloride 101      Carbon Dioxide (CO2) 26      Anion Gap 12      Urea Nitrogen 30.0 (*)     Creatinine 1.05      GFR Estimate 73      Calcium 8.5 (*)     Glucose 97     ETHYL ALCOHOL LEVEL - Abnormal    Alcohol ethyl 0.19 (*)    ISTAT GASES LACTATE VENOUS POCT - Abnormal     Lactic Acid POCT 3.0 (*)     Bicarbonate Venous POCT 31 (*)     O2 Sat, Venous POCT 42 (*)     pCO2 Venous POCT 60 (*)     pH Venous POCT 7.32      pO2 Venous POCT 26     LACTIC ACID WHOLE BLOOD WITH 1X REPEAT IN 2 HR WHEN >2 - Abnormal    Lactic Acid, Initial 3.0 (*)    KETONE BETA-HYDROXYBUTYRATE QUANTITATIVE, RAPID - Abnormal    Ketone (Beta-Hydroxybutyrate) Quantitative 0.53 (*)    TROPONIN T, HIGH SENSITIVITY - Abnormal    Troponin T, High Sensitivity 28 (*)    MAGNESIUM - Normal    Magnesium 1.9     NT PROBNP INPATIENT - Normal    N terminal Pro BNP Inpatient 436     INFLUENZA A/B, RSV AND SARS-COV2 PCR - Normal    Influenza A PCR Negative      Influenza B PCR Negative      RSV PCR Negative      SARS CoV2 PCR Negative     POTASSIUM - Normal    Potassium 4.6     AST - Normal    AST 36     HEPATIC FUNCTION PANEL    Protein Total 6.6      Albumin 4.1      Bilirubin Total 0.5      Alkaline Phosphatase 54      AST        ALT 21      Bilirubin Direct <0.08     TROPONIN T, HIGH SENSITIVITY    Troponin T, High Sensitivity       CBC WITH PLATELETS AND DIFFERENTIAL    WBC Count 4.3      RBC Count 4.73      Hemoglobin 14.7      Hematocrit 43.6      MCV 92      MCH 31.1      MCHC 33.7      RDW 14.2      Platelet Count 194      % Neutrophils 61      % Lymphocytes 27      % Monocytes 8      % Eosinophils 3      % Basophils 1      % Immature Granulocytes 1      NRBCs per 100 WBC 0      Absolute Neutrophils 2.6      Absolute Lymphocytes 1.2      Absolute Monocytes 0.4      Absolute Eosinophils 0.1      Absolute Basophils 0.0      Absolute Immature Granulocytes 0.0      Absolute NRBCs 0.0     KETONE BETA-HYDROXYBUTYRATE QUANTITATIVE, RAPID    Ketone (Beta-Hydroxybutyrate) Quantitative       LACTIC ACID WHOLE BLOOD   ETHYL ALCOHOL LEVEL     Imaging   XR Elbow Bilateral G/E 3 Views   Final Result   IMPRESSION: Demineralization without displaced fracture. No left or right elbow joint effusion. Joint spaces are preserved. Small  focus of heterotopic ossification adjacent to the right humeral epicondyle.      CT Chest (PE) Abdomen Pelvis w Contrast   Final Result   IMPRESSION:   1.  No evidence of acute pulmonary embolism.   2.  Mild opacities in both lung bases, right greater than left. These are nonspecific but favor infection.   3.  Emphysema with advanced destructive changes.   4.  Moderate fatty infiltration of the liver.   5.  Prostatectomy.         Head CT w/o contrast   Final Result   IMPRESSION:   1.  No CT evidence for acute intracranial process.   2.  Brain atrophy and presumed chronic microvascular ischemic changes as above.        EKG   ECG results from 04/19/25   EKG 12-lead, tracing only     Value    Systolic Blood Pressure     Diastolic Blood Pressure     Ventricular Rate 88    Atrial Rate 88    NY Interval 140    QRS Duration 108        QTc 476    P Axis 42    R AXIS 82    T Axis -39    Interpretation ECG      Sinus rhythm  Right bundle branch block  T wave abnormality, consider lateral ischemia  Abnormal ECG  EKG interpreted by me at 1800       Independent Interpretation   No fracture dislocation or pathologic joint effusion the bilateral radiographs of the elbow    ED Course      Medications Administered   Medications   doxycycline (VIBRAMYCIN) 100 mg vial to attach to  mL bag (has no administration in time range)   dextrose 5% and 0.9% NaCl infusion (has no administration in time range)   thiamine (B-1) tablet 100 mg (has no administration in time range)   folic acid (FOLVITE) tablet 1 mg (has no administration in time range)   magnesium sulfate 2 g in 50 mL sterile water intermittent infusion (has no administration in time range)   albuterol (PROVENTIL HFA/VENTOLIN HFA) inhaler (4 puffs Inhalation $Given 4/19/25 1812)   methylPREDNISolone Na Suc (solu-MEDROL) injection 125 mg (125 mg Intravenous $Given 4/19/25 1814)   sodium chloride 0.9% BOLUS 1,000 mL (0 mLs Intravenous Stopped 4/19/25 2118)   sodium  chloride 0.9% BOLUS 500 mL (500 mLs Intravenous $New Bag 4/19/25 2134)   sodium chloride 0.9 % bag for CT scan flush (93 mLs Intravenous $Given 4/19/25 2100)   iopamidol (ISOVUE-370) solution 500 mL (92 mLs Intravenous $Given 4/19/25 2100)   cefTRIAXone (ROCEPHIN) 2 g vial to attach to  ml bag for ADULTS or NS 50 ml bag for PEDS (2 g Intravenous $New Bag 4/19/25 2204)     Procedures   Procedures     Discussion of Management   Admitting Hospitalist,      ED Course   ED Course as of 04/19/25 2235   Sat Apr 19, 2025 1657 I obtained the history and examined the patient as noted above.      1759 I spoke with the patient's Daughter.    2143 No fracture dislocation or pathologic joint effusion the bilateral radiographs of the elbow   2155 I rechecked and updated the patient.        Additional Documentation  None    Medical Decision Making / Diagnosis     CMS Diagnoses: IV Antibiotics given and/or elevated Lactate of 3 and no sepsis note found - Delete this reminder and enter the sepsis note or '.edcms' before signing chart.>>>  Lactate is elevated this is due to his Dehydration, alcohol abuse, poor recent p.o. intake.  I do not think the elevated lactate is driven by infection.    MIPS    CT for PE was ordered because the patient had an abnormal d-dimer.    JESSICA Adams is a 79 year old male     Presenting with altered mental status, shortness of breath, hypoxia.  Patient was just in [facility] for the last 2-1/2 months returning a week or so ago.  Presents tonight family concern of altered mental status increased work of breathing.  History of COPD has as needed oxygen use at home where he uses 2 L to 3 L mostly wears at night.  Was in [facility] without any oxygen including being at elevation.  He states he was hospitalized for 4 days in [facility] and treated for pneumonia transition from IV to oral antibiotics for 10-day course which she finished prior to arriving in [place].  He does not recall  what those antibiotics were.  History of VTE on Xarelto and states he has been compliant with his anticoagulation.  Had a fall earlier today and has skin tears on bilateral elbows.  Skeletal survey otherwise unremarkable.  Hypoxic on room air here into the mid to upper 80s he is on 2 to 3 L nasal cannula with sats in the mid 90s.  Given bronchodilators as well as dose of methylprednisolone given history of COPD.  D-dimer elevated prompting CT scan which shows no PE but does show bilateral basilar infiltrates concerning for infection so was given ceftriaxone and doxycycline.  He also has elevated alcohol level, ketosis which is likely of starvation or alcohol-related ketosis.  Recommending admission for antibiotics, continue treatment of his COPD pneumonia and metabolic status.  Radiograph of the elbows are negative for fracture.    Bilateral lower extremity edema,   Seems like bilateral DVT would be unlikely given his anticoagulated status.  The left foot is slightly more erythematous than the right.  There is no obvious open wounds there is no significant tenderness palpation no crepitus no proximal streaking lymphangitis it seems less likely that this would represent a skin or soft tissue infection.      Troponin elevated mildly.  ECG not concerning for acute occlusive coronary process.  Likely secondary to demand from his COPD exacerbation/metabolic state/pneumonia.      Disposition   The patient was admitted to the hospital.     Diagnosis     ICD-10-CM    1. COPD with acute exacerbation (H)  J44.1       2. Pneumonia of both lower lobes due to infectious organism  J18.9       3. Fall, initial encounter  W19.XXXA       4. Skin tear of elbow without complication, unspecified laterality, initial encounter  S51.019A       5. Alcohol use disorder  F10.90       6. Ketosis (H)  E88.89       7. Myocardial injury  I5A            Scribe Disclosure:  Zuleika GOMEZ, am serving as a scribe at 4:48 PM on 4/19/2025 to  document services personally performed by Will Valerio MD based on my observations and the provider's statements to me.        Will Valerio MD  04/19/25 2230       Will Valerio MD  04/19/25 2232

## 2025-04-20 PROBLEM — F10.10 ETOH ABUSE: Status: ACTIVE | Noted: 2025-04-20

## 2025-04-20 LAB
ANION GAP SERPL CALCULATED.3IONS-SCNC: 13 MMOL/L (ref 7–15)
BUN SERPL-MCNC: 24.4 MG/DL (ref 8–23)
CALCIUM SERPL-MCNC: 8.6 MG/DL (ref 8.8–10.4)
CHLORIDE SERPL-SCNC: 96 MMOL/L (ref 98–107)
CREAT SERPL-MCNC: 0.71 MG/DL (ref 0.67–1.17)
EGFRCR SERPLBLD CKD-EPI 2021: >90 ML/MIN/1.73M2
ERYTHROCYTE [DISTWIDTH] IN BLOOD BY AUTOMATED COUNT: 13.8 % (ref 10–15)
GLUCOSE BLDC GLUCOMTR-MCNC: 141 MG/DL (ref 70–99)
GLUCOSE BLDC GLUCOMTR-MCNC: 162 MG/DL (ref 70–99)
GLUCOSE SERPL-MCNC: 317 MG/DL (ref 70–99)
HCO3 SERPL-SCNC: 23 MMOL/L (ref 22–29)
HCT VFR BLD AUTO: 43.1 % (ref 40–53)
HGB BLD-MCNC: 14.6 G/DL (ref 13.3–17.7)
LACTATE SERPL-SCNC: 2.3 MMOL/L (ref 0.7–2)
LACTATE SERPL-SCNC: 3.1 MMOL/L (ref 0.7–2)
LACTATE SERPL-SCNC: 3.9 MMOL/L (ref 0.7–2)
MAGNESIUM SERPL-MCNC: 1.8 MG/DL (ref 1.7–2.3)
MAGNESIUM SERPL-MCNC: 1.8 MG/DL (ref 1.7–2.3)
MCH RBC QN AUTO: 30.7 PG (ref 26.5–33)
MCHC RBC AUTO-ENTMCNC: 33.9 G/DL (ref 31.5–36.5)
MCV RBC AUTO: 91 FL (ref 78–100)
PHOSPHATE SERPL-MCNC: 2.6 MG/DL (ref 2.5–4.5)
PLATELET # BLD AUTO: 205 10E3/UL (ref 150–450)
POTASSIUM SERPL-SCNC: 5.2 MMOL/L (ref 3.4–5.3)
PROCALCITONIN SERPL IA-MCNC: 0.03 NG/ML
RBC # BLD AUTO: 4.75 10E6/UL (ref 4.4–5.9)
SODIUM SERPL-SCNC: 132 MMOL/L (ref 135–145)
WBC # BLD AUTO: 4.8 10E3/UL (ref 4–11)

## 2025-04-20 PROCEDURE — 85027 COMPLETE CBC AUTOMATED: CPT | Performed by: INTERNAL MEDICINE

## 2025-04-20 PROCEDURE — 83605 ASSAY OF LACTIC ACID: CPT | Performed by: INTERNAL MEDICINE

## 2025-04-20 PROCEDURE — 36415 COLL VENOUS BLD VENIPUNCTURE: CPT | Performed by: INTERNAL MEDICINE

## 2025-04-20 PROCEDURE — 120N000001 HC R&B MED SURG/OB

## 2025-04-20 PROCEDURE — 80048 BASIC METABOLIC PNL TOTAL CA: CPT | Performed by: INTERNAL MEDICINE

## 2025-04-20 PROCEDURE — 250N000011 HC RX IP 250 OP 636: Performed by: EMERGENCY MEDICINE

## 2025-04-20 PROCEDURE — 250N000012 HC RX MED GY IP 250 OP 636 PS 637: Performed by: INTERNAL MEDICINE

## 2025-04-20 PROCEDURE — 36415 COLL VENOUS BLD VENIPUNCTURE: CPT | Performed by: EMERGENCY MEDICINE

## 2025-04-20 PROCEDURE — 258N000003 HC RX IP 258 OP 636: Performed by: INTERNAL MEDICINE

## 2025-04-20 PROCEDURE — 250N000013 HC RX MED GY IP 250 OP 250 PS 637: Performed by: INTERNAL MEDICINE

## 2025-04-20 PROCEDURE — 82962 GLUCOSE BLOOD TEST: CPT

## 2025-04-20 PROCEDURE — 250N000011 HC RX IP 250 OP 636: Performed by: INTERNAL MEDICINE

## 2025-04-20 PROCEDURE — 83735 ASSAY OF MAGNESIUM: CPT | Performed by: INTERNAL MEDICINE

## 2025-04-20 PROCEDURE — 99232 SBSQ HOSP IP/OBS MODERATE 35: CPT | Performed by: INTERNAL MEDICINE

## 2025-04-20 PROCEDURE — 83605 ASSAY OF LACTIC ACID: CPT | Performed by: EMERGENCY MEDICINE

## 2025-04-20 RX ORDER — MULTIPLE VITAMINS W/ MINERALS TAB 9MG-400MCG
1 TAB ORAL DAILY
Status: DISCONTINUED | OUTPATIENT
Start: 2025-04-20 | End: 2025-04-22 | Stop reason: HOSPADM

## 2025-04-20 RX ORDER — DIAZEPAM 5 MG/1
10 TABLET ORAL EVERY 30 MIN PRN
Status: DISCONTINUED | OUTPATIENT
Start: 2025-04-20 | End: 2025-04-22 | Stop reason: HOSPADM

## 2025-04-20 RX ORDER — DOXYCYCLINE 100 MG/1
100 CAPSULE ORAL EVERY 12 HOURS SCHEDULED
Status: DISCONTINUED | OUTPATIENT
Start: 2025-04-20 | End: 2025-04-22 | Stop reason: HOSPADM

## 2025-04-20 RX ORDER — SODIUM CHLORIDE 9 MG/ML
INJECTION, SOLUTION INTRAVENOUS CONTINUOUS
Status: ACTIVE | OUTPATIENT
Start: 2025-04-20 | End: 2025-04-21

## 2025-04-20 RX ORDER — AMOXICILLIN 250 MG
2 CAPSULE ORAL 2 TIMES DAILY PRN
Status: DISCONTINUED | OUTPATIENT
Start: 2025-04-20 | End: 2025-04-22 | Stop reason: HOSPADM

## 2025-04-20 RX ORDER — NICOTINE POLACRILEX 4 MG
15-30 LOZENGE BUCCAL
Status: DISCONTINUED | OUTPATIENT
Start: 2025-04-20 | End: 2025-04-22 | Stop reason: HOSPADM

## 2025-04-20 RX ORDER — DIAZEPAM 10 MG/2ML
5-10 INJECTION, SOLUTION INTRAMUSCULAR; INTRAVENOUS EVERY 30 MIN PRN
Status: DISCONTINUED | OUTPATIENT
Start: 2025-04-20 | End: 2025-04-22 | Stop reason: HOSPADM

## 2025-04-20 RX ORDER — GABAPENTIN 300 MG/1
900 CAPSULE ORAL EVERY 8 HOURS
Status: DISCONTINUED | OUTPATIENT
Start: 2025-04-20 | End: 2025-04-22

## 2025-04-20 RX ORDER — TIOTROPIUM BROMIDE AND OLODATEROL 3.124; 2.736 UG/1; UG/1
2 SPRAY, METERED RESPIRATORY (INHALATION) DAILY
COMMUNITY
Start: 2024-09-15

## 2025-04-20 RX ORDER — HYDROXYZINE HYDROCHLORIDE 25 MG/1
50 TABLET, FILM COATED ORAL
COMMUNITY
Start: 2025-01-02

## 2025-04-20 RX ORDER — ONDANSETRON 4 MG/1
4 TABLET, ORALLY DISINTEGRATING ORAL EVERY 6 HOURS PRN
Status: DISCONTINUED | OUTPATIENT
Start: 2025-04-20 | End: 2025-04-22 | Stop reason: HOSPADM

## 2025-04-20 RX ORDER — GABAPENTIN 300 MG/1
300 CAPSULE ORAL EVERY 8 HOURS
Status: DISCONTINUED | OUTPATIENT
Start: 2025-04-25 | End: 2025-04-21

## 2025-04-20 RX ORDER — GABAPENTIN 300 MG/1
600 CAPSULE ORAL EVERY 8 HOURS
Status: DISCONTINUED | OUTPATIENT
Start: 2025-04-23 | End: 2025-04-22

## 2025-04-20 RX ORDER — DEXTROSE MONOHYDRATE 25 G/50ML
25-50 INJECTION, SOLUTION INTRAVENOUS
Status: DISCONTINUED | OUTPATIENT
Start: 2025-04-20 | End: 2025-04-22 | Stop reason: HOSPADM

## 2025-04-20 RX ORDER — ONDANSETRON 2 MG/ML
4 INJECTION INTRAMUSCULAR; INTRAVENOUS EVERY 6 HOURS PRN
Status: DISCONTINUED | OUTPATIENT
Start: 2025-04-20 | End: 2025-04-22 | Stop reason: HOSPADM

## 2025-04-20 RX ORDER — AMOXICILLIN 250 MG
1 CAPSULE ORAL 2 TIMES DAILY PRN
Status: DISCONTINUED | OUTPATIENT
Start: 2025-04-20 | End: 2025-04-22 | Stop reason: HOSPADM

## 2025-04-20 RX ORDER — ALBUTEROL SULFATE 90 UG/1
1-2 INHALANT RESPIRATORY (INHALATION) EVERY 4 HOURS PRN
Status: DISCONTINUED | OUTPATIENT
Start: 2025-04-20 | End: 2025-04-22 | Stop reason: HOSPADM

## 2025-04-20 RX ORDER — FLUMAZENIL 0.1 MG/ML
0.2 INJECTION, SOLUTION INTRAVENOUS
Status: DISCONTINUED | OUTPATIENT
Start: 2025-04-20 | End: 2025-04-22 | Stop reason: HOSPADM

## 2025-04-20 RX ORDER — AMLODIPINE BESYLATE 5 MG/1
5 TABLET ORAL DAILY
Status: DISCONTINUED | OUTPATIENT
Start: 2025-04-20 | End: 2025-04-22

## 2025-04-20 RX ORDER — CEFTRIAXONE 1 G/1
1 INJECTION, POWDER, FOR SOLUTION INTRAMUSCULAR; INTRAVENOUS EVERY 24 HOURS
Status: DISCONTINUED | OUTPATIENT
Start: 2025-04-20 | End: 2025-04-22 | Stop reason: HOSPADM

## 2025-04-20 RX ORDER — ACETAMINOPHEN 325 MG/1
650 TABLET ORAL EVERY 4 HOURS PRN
Status: DISCONTINUED | OUTPATIENT
Start: 2025-04-20 | End: 2025-04-22 | Stop reason: HOSPADM

## 2025-04-20 RX ORDER — FLUTICASONE FUROATE AND VILANTEROL 100; 25 UG/1; UG/1
1 POWDER RESPIRATORY (INHALATION) DAILY
Status: DISCONTINUED | OUTPATIENT
Start: 2025-04-20 | End: 2025-04-20

## 2025-04-20 RX ORDER — GABAPENTIN 100 MG/1
100 CAPSULE ORAL EVERY 8 HOURS
Status: DISCONTINUED | OUTPATIENT
Start: 2025-04-27 | End: 2025-04-21

## 2025-04-20 RX ORDER — CLONIDINE HYDROCHLORIDE 0.1 MG/1
0.1 TABLET ORAL EVERY 8 HOURS
Status: DISCONTINUED | OUTPATIENT
Start: 2025-04-20 | End: 2025-04-22

## 2025-04-20 RX ORDER — GABAPENTIN 600 MG/1
1200 TABLET ORAL ONCE
Status: COMPLETED | OUTPATIENT
Start: 2025-04-20 | End: 2025-04-20

## 2025-04-20 RX ORDER — PRAVASTATIN SODIUM 10 MG
1 TABLET ORAL DAILY
COMMUNITY
Start: 2024-08-04

## 2025-04-20 RX ORDER — HALOPERIDOL 5 MG/ML
2.5-5 INJECTION INTRAMUSCULAR EVERY 6 HOURS PRN
Status: DISCONTINUED | OUTPATIENT
Start: 2025-04-20 | End: 2025-04-22 | Stop reason: HOSPADM

## 2025-04-20 RX ORDER — THIAMINE HYDROCHLORIDE 100 MG/ML
100 INJECTION, SOLUTION INTRAMUSCULAR; INTRAVENOUS DAILY
Status: DISCONTINUED | OUTPATIENT
Start: 2025-04-20 | End: 2025-04-22

## 2025-04-20 RX ORDER — OLANZAPINE 5 MG/1
5-10 TABLET, ORALLY DISINTEGRATING ORAL EVERY 6 HOURS PRN
Status: DISCONTINUED | OUTPATIENT
Start: 2025-04-20 | End: 2025-04-22 | Stop reason: HOSPADM

## 2025-04-20 RX ORDER — ASPIRIN 81 MG/1
81 TABLET ORAL DAILY
Status: DISCONTINUED | OUTPATIENT
Start: 2025-04-20 | End: 2025-04-20

## 2025-04-20 RX ORDER — FOLIC ACID 5 MG/ML
1 INJECTION, SOLUTION INTRAMUSCULAR; INTRAVENOUS; SUBCUTANEOUS DAILY
Status: DISCONTINUED | OUTPATIENT
Start: 2025-04-20 | End: 2025-04-22

## 2025-04-20 RX ORDER — CALCIUM CARBONATE 500 MG/1
1000 TABLET, CHEWABLE ORAL 4 TIMES DAILY PRN
Status: DISCONTINUED | OUTPATIENT
Start: 2025-04-20 | End: 2025-04-22 | Stop reason: HOSPADM

## 2025-04-20 RX ORDER — ACETAMINOPHEN 650 MG/1
650 SUPPOSITORY RECTAL EVERY 4 HOURS PRN
Status: DISCONTINUED | OUTPATIENT
Start: 2025-04-20 | End: 2025-04-22 | Stop reason: HOSPADM

## 2025-04-20 RX ORDER — LIDOCAINE 40 MG/G
CREAM TOPICAL
Status: DISCONTINUED | OUTPATIENT
Start: 2025-04-20 | End: 2025-04-22 | Stop reason: HOSPADM

## 2025-04-20 RX ADMIN — SODIUM CHLORIDE: 9 INJECTION, SOLUTION INTRAVENOUS at 21:33

## 2025-04-20 RX ADMIN — RIVAROXABAN 10 MG: 10 TABLET, FILM COATED ORAL at 16:47

## 2025-04-20 RX ADMIN — GABAPENTIN 900 MG: 300 CAPSULE ORAL at 13:19

## 2025-04-20 RX ADMIN — INSULIN ASPART 1 UNITS: 100 INJECTION, SOLUTION INTRAVENOUS; SUBCUTANEOUS at 17:55

## 2025-04-20 RX ADMIN — CLONIDINE HYDROCHLORIDE 0.1 MG: 0.1 TABLET ORAL at 16:47

## 2025-04-20 RX ADMIN — SODIUM CHLORIDE: 9 INJECTION, SOLUTION INTRAVENOUS at 03:39

## 2025-04-20 RX ADMIN — CLONIDINE HYDROCHLORIDE 0.1 MG: 0.1 TABLET ORAL at 03:47

## 2025-04-20 RX ADMIN — DOXYCYCLINE HYCLATE 100 MG: 100 CAPSULE ORAL at 20:22

## 2025-04-20 RX ADMIN — SENNOSIDES AND DOCUSATE SODIUM 1 TABLET: 50; 8.6 TABLET ORAL at 08:34

## 2025-04-20 RX ADMIN — DOXYCYCLINE HYCLATE 100 MG: 100 CAPSULE ORAL at 08:23

## 2025-04-20 RX ADMIN — THIAMINE HYDROCHLORIDE 100 MG: 100 INJECTION, SOLUTION INTRAMUSCULAR; INTRAVENOUS at 08:23

## 2025-04-20 RX ADMIN — MAGNESIUM SULFATE HEPTAHYDRATE 2 G: 40 INJECTION, SOLUTION INTRAVENOUS at 00:19

## 2025-04-20 RX ADMIN — CLONIDINE HYDROCHLORIDE 0.1 MG: 0.1 TABLET ORAL at 08:24

## 2025-04-20 RX ADMIN — GABAPENTIN 900 MG: 300 CAPSULE ORAL at 21:33

## 2025-04-20 RX ADMIN — GABAPENTIN 1200 MG: 600 TABLET, FILM COATED ORAL at 03:47

## 2025-04-20 RX ADMIN — Medication 1 TABLET: at 08:23

## 2025-04-20 RX ADMIN — AMLODIPINE BESYLATE 5 MG: 5 TABLET ORAL at 12:34

## 2025-04-20 RX ADMIN — CEFTRIAXONE 1 G: 1 INJECTION, POWDER, FOR SOLUTION INTRAMUSCULAR; INTRAVENOUS at 21:33

## 2025-04-20 RX ADMIN — FOLIC ACID 1 MG: 5 INJECTION, SOLUTION INTRAMUSCULAR; INTRAVENOUS; SUBCUTANEOUS at 08:26

## 2025-04-20 RX ADMIN — ACETAMINOPHEN 650 MG: 325 TABLET, FILM COATED ORAL at 16:47

## 2025-04-20 ASSESSMENT — ACTIVITIES OF DAILY LIVING (ADL)
ADLS_ACUITY_SCORE: 46
ADLS_ACUITY_SCORE: 49
ADLS_ACUITY_SCORE: 49
ADLS_ACUITY_SCORE: 41
ADLS_ACUITY_SCORE: 29
ADLS_ACUITY_SCORE: 49
ADLS_ACUITY_SCORE: 47
ADLS_ACUITY_SCORE: 41
ADLS_ACUITY_SCORE: 46
ADLS_ACUITY_SCORE: 49
ADLS_ACUITY_SCORE: 49
ADLS_ACUITY_SCORE: 29
ADLS_ACUITY_SCORE: 46
ADLS_ACUITY_SCORE: 46
ADLS_ACUITY_SCORE: 41
ADLS_ACUITY_SCORE: 49
ADLS_ACUITY_SCORE: 29
ADLS_ACUITY_SCORE: 47
ADLS_ACUITY_SCORE: 47
ADLS_ACUITY_SCORE: 29
ADLS_ACUITY_SCORE: 49
ADLS_ACUITY_SCORE: 29
ADLS_ACUITY_SCORE: 41

## 2025-04-20 NOTE — PROGRESS NOTES
[facility]    ED Boarding Nurse Handoff Addendum Report:    Date/time: 4/20/2025, 6:02 PM    Activity Level: assist of 1    Fall Risk: Yes:  nonskid shoes/slippers when out of bed, patient and family education, and activity supervised    Active Infusions: NS    Current Meds Due: NONE    Current care needs: Pain management, CIWAs, pending consults    Oxygen requirements (liters/min and/or FiO2): none    Respiratory status: Room air    Vital signs (within last 30 minutes):    Vitals:    04/20/25 1105 04/20/25 1457 04/20/25 1700 04/20/25 1759   BP:  125/81 131/90    BP Location:  Left arm Left arm    Pulse: 103 100 101    Resp:  18 16    Temp:  97.8  F (36.6  C) 98.4  F (36.9  C)    TempSrc:  Oral Oral    SpO2: 93% (!) 91% 93% 92%       Focused assessment within last 30 minutes:    Patient pleasant and conversant this shift. No suicidal comments. Tylenol for HA. CIWA at 3. Daughter was here visiting.    ED Boarding Nurse name: Mary Lincoln RN

## 2025-04-20 NOTE — PROGRESS NOTES
[facility]    Medicine Progress Note - Hospitalist Service    Date of Admission:  4/19/2025    Assessment & Plan   Omar Adams is a 79-year-old male with medical history significant for PE on Xarelto, COPD and chronic respiratory failure with hypoxia on 3 L of oxygen, chronic pain, depression, and alcohol abuse.  He presented to the ED with complaints of alcohol intoxication with multiple falls.  Patient reports that his family made him come to the ED because he has been suicidal.    # Acute alcohol intoxication  # History of alcohol abuse  Patient reports that he has been drinking more than he normally does.  On presentation, his protocol level was 0.13.  CIWA protocol  Continue thiamine  Telemonitoring  Chemical dependency consult    # Alcoholic ketoacidosis  # Hypokalemia  # Lactic acidosis: Likely due to alcoholic ketoacidosis.  Lactic acid level is trending up.  IV fluids  Trend lactic acid levels  Continue antibiotics for possible pneumonia    # Probable community-acquired pneumonia: Patient with x-ray showing bibasilar infiltrates.  Was recently treated for pneumonia in [facility]  Continue Rocephin and doxycycline    # Hyperkalemia: Presented with potassium of 5.6.  Has improved to 4.6 and 5.2  Monitor potassium levels  Monitor renal function    # CHRIS  # Creatinine of 1.05 on presentation.  Has improved to 0.71 with IV fluids  Monitor renal function    # Hyperglycemia: Likely due to steroids  Blood glucose this morning is 317.  Glucose was 97 on presentation.  Correctional scale insulin  Hypoglycemia protocol    # COPD  # History of PE  # Chronic respiratory failure with hypoxia  Continue breathing treatments  Continue Xarelto    # Hypertension  Continue amlodipine    # Depression  # Suicidal ideation  Sitter at bedside  Psychiatry consulted, will appreciate their input          Diet: Combination Diet Regular Diet Adult    DVT Prophylaxis: Pneumatic Compression Devices  Zaid  Catheter: Not present  Lines: None     Cardiac Monitoring: None  Code Status: Full Code      Clinically Significant Risk Factors Present on Admission        # Hyperkalemia: Highest K = 5.6 mmol/L in last 2 days, will monitor as appropriate  # Hyponatremia: Lowest Na = 132 mmol/L in last 2 days, will monitor as appropriate  # Hypochloremia: Lowest Cl = 96 mmol/L in last 2 days, will monitor as appropriate  # Hypocalcemia: Lowest Ca = 8.5 mg/dL in last 2 days, will monitor and replace as appropriate      # Drug Induced Coagulation Defect: home medication list includes an anticoagulant medication    # Hypertension: Noted on problem list               # COPD: noted on problem list        Social Drivers of Health    Depression: Not at risk (8/8/2024)    Received from [facility]    PHQ-2     PHQ-2 TOTAL SCORE: 2   Recent Concern: Depression - At risk (5/29/2024)    Received from [facility]    PHQ-2     PHQ-2 TOTAL SCORE: 3   Tobacco Use: Medium Risk (1/2/2025)    Received from [facility]    Patient History     Smoking Tobacco Use: Former     Smokeless Tobacco Use: Never    Received from [facility]    Social Connections          Disposition Plan     Medically Ready for Discharge: Anticipated in 2-4 Days        Ramón Watkins MD  Hospitalist Service  [facility]  Securely message with [employer] (more info)  Text page via [employer] Paging/Directory   ______________________________________________________________________    Interval History   No acute events overnight. Denies fevers, chills, chest pain, shortness of breath, n/v/d/c. Reports that he has been suicidal so his family brought him to the hospital. Has been drinking a little more than he typically would.     Physical Exam   Vital Signs: Temp: 97.5  F (36.4  C) Temp src: Oral BP: (!) 152/83 Pulse: 103   Resp: 18  SpO2: 93 % O2 Device: None (Room air) Oxygen Delivery: 2 LPM  Weight: 0 lbs 0 oz    General: AAOx3, NAD, pleasant.  HEENT: NCAT, pupils are equal and round, anicteric, oral mucosa is moist.  Neck: Supple,   Cardiac: RRR.  No murmur. No rub or gallop.  Respiratory: CTAB, no crackles, wheezes, rales, or rhonchi  Abdomen: Soft, NT, ND, + bowel sounds  Extremity: No LE edema. DP pulses palpable.   Neuro: AAO x3,   Psych: Calm and cooperative. Flat affect, good insight.       Medical Decision Making       >35 MINUTES SPENT BY ME on the date of service doing chart review, history, exam, documentation & further activities per the note.      Data     I have personally reviewed the following data over the past 24 hrs:    4.8  \   14.6   / 205     132 (L) 96 (L) 24.4 (H) /  317 (H)   5.2 23 0.71 \     ALT: 21 AST: 36 AP: 54 TBILI: 0.5   ALB: 4.1 TOT PROTEIN: 6.6 LIPASE: N/A     Trop: 28 (H) BNP: 436     Procal: 0.03 CRP: N/A Lactic Acid: 3.9 (H)       INR:  N/A PTT:  N/A   D-dimer:  0.69 (H) Fibrinogen:  N/A       Imaging results reviewed over the past 24 hrs:   Recent Results (from the past 24 hours)   Head CT w/o contrast    Narrative    EXAM: CT HEAD W/O CONTRAST  LOCATION: [facility]  DATE: 4/19/2025    INDICATION: fall, repsortedly on DOAC  COMPARISON: 11/26/2024.  TECHNIQUE: Routine CT Head without IV contrast. Multiplanar reformats. Dose reduction techniques were used.    FINDINGS:  INTRACRANIAL CONTENTS: No intracranial hemorrhage, extraaxial collection, or mass effect.  No CT evidence of acute infarct. Moderate presumed chronic small vessel ischemic changes. Mild generalized volume loss. No hydrocephalus.     VISUALIZED ORBITS/SINUSES/MASTOIDS: No intraorbital abnormality. No paranasal sinus mucosal disease. No middle ear or mastoid effusion.    BONES/SOFT TISSUES: No acute abnormality.      Impression    IMPRESSION:  1.  No CT evidence for acute intracranial process.  2.  Brain atrophy and  presumed chronic microvascular ischemic changes as above.   CT Chest (PE) Abdomen Pelvis w Contrast    Narrative    EXAM: CT CHEST PE ABDOMEN PELVIS W CONTRAST  LOCATION: [facility]  DATE: 4/19/2025    INDICATION: Shortness of breath, hypoxia, BLE edema, h o VTE  COMPARISON: 8/7/2023  TECHNIQUE: CT chest pulmonary angiogram and routine CT abdomen pelvis with IV contrast. Arterial phase through the chest and venous phase through the abdomen and pelvis. Multiplanar reformats and MIP reconstructions were performed. Dose reduction   techniques were used.   CONTRAST: 92mL Isovue 370    FINDINGS:  ANGIOGRAM CHEST: No evidence of acute pulmonary embolism. Mild calcified plaque in the thoracic aorta. No evidence of dissection.     LUNGS AND PLEURA: Emphysema with advanced destructive changes. A few scattered nodules measuring up to 3 mm remain unchanged and can be considered benign. No follow-up needed per Fleischner Society guidelines. There are mild opacities in both lung bases,   right greater than left. These are nonspecific but favor infection. No consolidation. No pleural effusions.    MEDIASTINUM/AXILLAE: Normal heart size. No pericardial effusions. No adenopathy.    CORONARY ARTERY CALCIFICATION: Severe.    HEPATOBILIARY: Moderate fatty infiltration of the liver. Unchanged 3.7 cm cyst. No follow-up needed.    PANCREAS: Normal.    SPLEEN: Normal.    ADRENAL GLANDS: Normal.    KIDNEYS/BLADDER: Normal.    BOWEL: Normal.    LYMPH NODES: Normal.    VASCULATURE: Diffuse calcified aortoiliac atheromatous plaque. The IVC and iliac veins are patent.    PELVIC ORGANS: Prostatectomy.    MUSCULOSKELETAL: Partially visualized postoperative changes in the proximal left humerus. Bones are demineralized. Compression deformities involving T7 and T8 are unchanged. Additional compression deformities of the lumbar vertebral bodies were not all   included on the prior exam but are likely chronic.      Impression     IMPRESSION:  1.  No evidence of acute pulmonary embolism.  2.  Mild opacities in both lung bases, right greater than left. These are nonspecific but favor infection.  3.  Emphysema with advanced destructive changes.  4.  Moderate fatty infiltration of the liver.  5.  Prostatectomy.     XR Elbow Bilateral G/E 3 Views    Narrative    EXAM: XR ELBOW BILATERAL G/E 3 VIEWS  LOCATION: [facility]  DATE: 4/19/2025    INDICATION: fall, pain, skin tears  COMPARISON: None.      Impression    IMPRESSION: Demineralization without displaced fracture. No left or right elbow joint effusion. Joint spaces are preserved. Small focus of heterotopic ossification adjacent to the right humeral epicondyle.

## 2025-04-20 NOTE — PLAN OF CARE
Goal Outcome Evaluation:      Plan of Care Reviewed With: patient        [facility]    ED Boarding Nurse Handoff Addendum Report:    Date/time: 4/20/2025, 2:19 PM    Activity Level: assist of 1    Fall Risk: Yes:  bed/chair alarm on, nonskid shoes/slippers when out of bed, patient and family education, assistive device/personal items within reach, activity supervised, and mobility aid in reach    Active Infusions: NS     Current Meds Due: None    Current care needs: Continued monitoring - pending consults    Oxygen requirements (liters/min and/or FiO2): None - Occasionally need 2L at rest    Respiratory status: Room air    Vital signs (within last 30 minutes):    Vitals:    04/20/25 0346 04/20/25 0805 04/20/25 1036 04/20/25 1105   BP: 130/89 (!) 152/83     Pulse: 92   103   Resp: 18 18     Temp: 97.7  F (36.5  C) 97.5  F (36.4  C)     TempSrc: Oral Oral     SpO2: 96%  94% 93%         ED Boarding Nurse name: Mark Quan RN   Outcome Evaluation: VSS. A&Ox4. Tele difficult to read - pt chest shaved and patches changed multiple times - appearing sinus rhythm with BBB. CIWA scoring low - mostly due to tremors. Pt up with 1 and gait belt/walker - unsteady on feet. Voiding adequately. 1x loose stool. IVF infusing. Lactic level decreasing. Tolerating PO intake. L and R elbow abrasion dressings CDI. Pt voicing concerns of being exposed to possible mosquito borne virus with recent travel - provider made aware.Plan for PT/OT/CD/SW evaluation and psych consult at this time. Discharge plans uncertain.    RECEIVING UNIT ED HANDOFF REVIEW    Above ED Nurse Handoff Report was reviewed: Yes  Reviewed by: Francoise Mathur, RN on April 20, 2025 at 1754 PM   I Amb[facility] called the ED to inform them the note was read: Yes

## 2025-04-20 NOTE — PLAN OF CARE
[facility]    ED Boarding Nurse Handoff Addendum Report:    Date/time: 4/20/2025, 4:31 AM    Activity Level: assist of 1, urinal at bedside    Fall Risk: Yes:  nonskid shoes/slippers when out of bed, patient and family education, assistive device/personal items within reach, and activity supervised    Active Infusions: NS at 100ml    Current Meds Due: none    Current care needs: monitor respiratory, ciwa q4hr     Oxygen requirements (liters/min and/or FiO2): 2    Respiratory status: Nasal cannula    Vital signs (within last 30 minutes):    Vitals:    04/20/25 0224 04/20/25 0239 04/20/25 0254 04/20/25 0346   BP:    130/89   Pulse: 89 91 79 92   Resp: 17 16 17    Temp:    97.7  F (36.5  C)   TempSrc:    Oral   SpO2: 94% 95% 94% 96%       Focused assessment within last 30 minutes:    A&ox4. CIWA 4/10. Denies pain.    ED Boarding Nurse name: Felipe Hall RN

## 2025-04-20 NOTE — PHARMACY-ADMISSION MEDICATION HISTORY
Pharmacist Admission Medication History    Admission medication history is complete. The information provided in this note is only as accurate as the sources available at the time of the update.    Information Source(s): Patient via in-person    Pertinent Information: -    Changes made to PTA medication list:  Added: Hydroxyzine, pravastatin, Stiolto  Deleted: Alprazolam, amlodipine, aspirin, Symbicort, medical cannabis, mirtazapine  Changed: None    Allergies reviewed with patient and updates made in EHR: yes    Medication History Completed By: Phil Kilpatrick [facility] 4/20/2025 9:30 AM    PTA Med List   Medication Sig Last Dose/Taking    albuterol (PROAIR HFA/PROVENTIL HFA/VENTOLIN HFA) 108 (90 Base) MCG/ACT inhaler Inhale 1-2 puffs into the lungs Past Month    hydrOXYzine HCl (ATARAX) 25 MG tablet Take 50 mg by mouth nightly as needed for other (Insomnia). 4/18/2025 Evening    pravastatin (PRAVACHOL) 10 MG tablet Take 1 tablet by mouth daily. 4/19/2025 Morning    rivaroxaban ANTICOAGULANT (XARELTO) 10 MG TABS tablet Take 10 mg by mouth daily (with dinner). 4/18/2025 Evening    STIOLTO RESPIMAT 2.5-2.5 MCG/ACT AERS Inhale 2 puffs into the lungs daily. 4/19/2025 Morning

## 2025-04-20 NOTE — H&P
[facility]    History and Physical - Hospitalist Service       Date of Admission:  4/19/2025    Assessment & Plan   Omar Adams is a 79 year old male with a history of PE on Xarelto, COPD on intermittent oxygen, chronic pain, depression and alcohol abuse presents with alcohol intoxication with multiple falls. The patient reports he is in the ED because his daughter and other family members are concerned about him. Shaq's family reported to EMS that he had been drinking, and the patient notes having two shots of vodka around 0500. The patient is chronically on 3 liters of O2 at home secondary to COPD, but denies supplemental oxygen use upon examination.  A few days ago he was calling the suicide hotline and waited for 20 minutes, he states he had loaded guns in his house.  He stopped taking celexa many months ago.  He does have a therapist.    ## Acute alcohol intoxication  ## Hx of alcohol abuse  Patient presents with blood alcohol level of 0.13 and does give a reasonable history but not sure how reliable he is given his intoxication.    Admit to inpatient   [facility] protocol  Give IV vitamines  Monitor on telemetry  CD evaluation and treatment    ## Alcohol related ketosis  ## Hyperkalemia  ## Mild lactic acid elevation  Patient has a normal wbc and denies any fever but was recently treated for pneumonia while in [facility].  He doesn't quite feel like he has fully recovered from this.  CXR shows mild opacities in both lung bases with right greater than left along with emphysema, moderate fatty liver.  Continue treatment with Ceftriaxone and Doxycycline to complete a 7 day course treatment  NS IVF infusion x 24 hours  Regular diet  Repeat LA in AM    ## Depression  ## Suicide ideation  Patient denies currently any active plan currently but a few days ago called with [employer] and was apparently on hold for 20 minutes.  He states he had loaded guns in his condo  Will obtain psychiatry  consultation  Patient ok with care team to contact his daughter for additional information.  He stopped taking Celexa a few months ago  He has a mental health therapist.  He was also on Mirtazepine a few years back    ## COPD / Emphysema  ## Hx of PE  ## Chronic oxygen dependency  Patient currently needs about 2 L of oxygen to maintain his oxygen saturation.  Will make duonebs QID  Continue albuterol MDI  Continue Symbicort  Continue Xarelto    ## Hypertension  Continue on Amlodipine    Diet:  regular diet  DVT Prophylaxis: Pneumatic Compression Devices  Mar Catheter: Not present  Lines: None     Cardiac Monitoring: None  Code Status:  Full    Clinically Significant Risk Factors Present on Admission        # Hyperkalemia: Highest K = 5.6 mmol/L in last 2 days, will monitor as appropriate    # Hypocalcemia: Lowest Ca = 8.5 mg/dL in last 2 days, will monitor and replace as appropriate      # Drug Induced Coagulation Defect: home medication list includes an anticoagulant medication  # Drug Induced Platelet Defect: home medication list includes an antiplatelet medication   # Hypertension: Noted on problem list                      Disposition Plan     Medically Ready for Discharge: Anticipated in 2-4 Days           Brendan Bejarano MD  Hospitalist Service    Securely message with Vocera (more info)  Text page via [employer] Paging/Directory     ______________________________________________________________________    Chief Complaint   ETOH intoxication    History is obtained from the patient, electronic health record, and emergency department physician    History of Present Illness   Omar Adams is a 79 year old male with a history of PE on Xarelto presenting with alcohol intoxication. The patient reports he is in the ED because his daughter and other family members are concerned about him. Shaq's family reported to EMS that he had been drinking, and the patient notes having two  shots of vodka around 0500. The patient is chronically on 3 liters of O2 at home secondary to COPD, but denies supplemental oxygen use upon examination. The patient also previously stated he experienced a fall around [id number] this morning with no head trauma or loss of consciousness, but denies falling recently upon examination. Shaq reports returning from [facility] on 4 days ago (4/15/25). He endorses shortness of breath, which he was experiencing in [facility]. The patient was admitted to the hospital for four days in [facility], and prescribed oral antibiotics for 10 days. He endorses fever, but denies vomiting, diarrhea or urinary symptoms. No ill contacts. Of note, the patient has new redness and swelling in his feet as pictured below. Patient's daughter reports he heavily drinks alcohol daily.     In the emergency department patient was afebrile, in the ER the patient is afebrile with stable blood pressures.  Labs were significant for potassium of 5.6 and elevation of BUN to creatinine ratio, calcium of 8.5, normal LFTs, lactic acid of 3 and ketones of 0.53.  Troponin was 28.  CBC is normal, venous blood gas showed pH of 7.32 with PCO2 of 60.  Viral panel is negative.  CT of the head showed no acute intracranial process but brain atrophy, CT of the chest showed no acute PE mild opacities in both lungs right greater than left nonspecific but favor infection there is emphysema and moderate fatty infiltration of the liver.    Former , in [facility] 4 day hosp for pneumonia, uses home oxygen at night.  Hx of etoh use disorder and drinking heavily both in [facility] and .  Had a fall today, skin tears in both elbows.  Hypoxic in mid 80s on room air, given solumedrol and nebs, food, IVF.  Feet swelling L > R    Past Medical History    Past Medical History:   Diagnosis Date    COPD (chronic obstructive pulmonary disease) (H)     COPD (chronic obstructive pulmonary disease) (H)     Proximal humerus fracture  09/27/2020    Pulmonary embolism (H) 2022       Past Surgical History   Past Surgical History:   Procedure Laterality Date    PROSTATECTOMY         Prior to Admission Medications   Prior to Admission Medications   Prescriptions Last Dose Informant Patient Reported? Taking?   ALPRAZolam (XANAX) 0.5 MG tablet   No No   Sig: Take 1 tablet (0.5 mg) by mouth nightly as needed For sleep/DANICA   Patient not taking: Reported on 5/24/2021   albuterol (PROAIR HFA/PROVENTIL HFA/VENTOLIN HFA) 108 (90 Base) MCG/ACT inhaler   Yes No   Sig: Inhale 1-2 puffs into the lungs   amLODIPine (NORVASC) 5 MG tablet   Yes No   Sig: Take 5 mg by mouth   aspirin 81 MG EC tablet   Yes No   Sig: Take 81 mg by mouth daily   budesonide-formoterol (SYMBICORT) 80-4.5 MCG/ACT Inhaler   Yes No   Sig: Inhale 1 puff into the lungs   medical cannabis (Patient's own supply)   Yes No   Sig: every 24 hours   mirtazapine (REMERON) 30 MG tablet   Yes No   Sig: Take 30 mg by mouth   rivaroxaban ANTICOAGULANT (XARELTO) 10 MG TABS tablet   Yes No   Sig: Take 10 mg by mouth.      Facility-Administered Medications: None           Physical Exam   Vital Signs:   Temp src: Oral BP: (!) 142/93 Pulse: 91   Resp: 15 SpO2: 98 % O2 Device: Nasal cannula Oxygen Delivery: 2 LPM  Weight: 0 lbs 0 oz    General Appearance: Disheveled, smelling of ETOH, NCAT  Respiratory: diminished at lung bases bilaterally  Cardiovascular: tachycardic, regular  GI: soft, NT, +BS  Skin: warm and dry, pedal and ankle edema  Other: Moves all 4 ext, CN intact     Medical Decision Making       60 MINUTES SPENT BY ME on the date of service doing chart review, history, exam, documentation & further activities per the note.      Data     I have personally reviewed the following data over the past 24 hrs:    4.3  \   14.7   / 194     139 101 30.0 (H) /  97   4.6 26 1.05 \     ALT: 21 AST: 36 AP: 54 TBILI: 0.5   ALB: 4.1 TOT PROTEIN: 6.6 LIPASE: N/A     Trop: 28 (H) BNP: 436     Procal: N/A CRP: N/A  Lactic Acid: 3.0 (H)       INR:  N/A PTT:  N/A   D-dimer:  0.69 (H) Fibrinogen:  N/A       Imaging results reviewed over the past 24 hrs:   Recent Results (from the past 24 hours)   Head CT w/o contrast    Narrative    EXAM: CT HEAD W/O CONTRAST  LOCATION: [facility]  DATE: 4/19/2025    INDICATION: fall, repsortedly on DOAC  COMPARISON: 11/26/2024.  TECHNIQUE: Routine CT Head without IV contrast. Multiplanar reformats. Dose reduction techniques were used.    FINDINGS:  INTRACRANIAL CONTENTS: No intracranial hemorrhage, extraaxial collection, or mass effect.  No CT evidence of acute infarct. Moderate presumed chronic small vessel ischemic changes. Mild generalized volume loss. No hydrocephalus.     VISUALIZED ORBITS/SINUSES/MASTOIDS: No intraorbital abnormality. No paranasal sinus mucosal disease. No middle ear or mastoid effusion.    BONES/SOFT TISSUES: No acute abnormality.      Impression    IMPRESSION:  1.  No CT evidence for acute intracranial process.  2.  Brain atrophy and presumed chronic microvascular ischemic changes as above.   CT Chest (PE) Abdomen Pelvis w Contrast    Narrative    EXAM: CT CHEST PE ABDOMEN PELVIS W CONTRAST  LOCATION: [facility]  DATE: 4/19/2025    INDICATION: Shortness of breath, hypoxia, BLE edema, h o VTE  COMPARISON: 8/7/2023  TECHNIQUE: CT chest pulmonary angiogram and routine CT abdomen pelvis with IV contrast. Arterial phase through the chest and venous phase through the abdomen and pelvis. Multiplanar reformats and MIP reconstructions were performed. Dose reduction   techniques were used.   CONTRAST: 92mL Isovue 370    FINDINGS:  ANGIOGRAM CHEST: No evidence of acute pulmonary embolism. Mild calcified plaque in the thoracic aorta. No evidence of dissection.     LUNGS AND PLEURA: Emphysema with advanced destructive changes. A few scattered nodules measuring up to 3 mm remain unchanged and can be considered benign. No follow-up needed  per Fleischner Society guidelines. There are mild opacities in both lung bases,   right greater than left. These are nonspecific but favor infection. No consolidation. No pleural effusions.    MEDIASTINUM/AXILLAE: Normal heart size. No pericardial effusions. No adenopathy.    CORONARY ARTERY CALCIFICATION: Severe.    HEPATOBILIARY: Moderate fatty infiltration of the liver. Unchanged 3.7 cm cyst. No follow-up needed.    PANCREAS: Normal.    SPLEEN: Normal.    ADRENAL GLANDS: Normal.    KIDNEYS/BLADDER: Normal.    BOWEL: Normal.    LYMPH NODES: Normal.    VASCULATURE: Diffuse calcified aortoiliac atheromatous plaque. The IVC and iliac veins are patent.    PELVIC ORGANS: Prostatectomy.    MUSCULOSKELETAL: Partially visualized postoperative changes in the proximal left humerus. Bones are demineralized. Compression deformities involving T7 and T8 are unchanged. Additional compression deformities of the lumbar vertebral bodies were not all   included on the prior exam but are likely chronic.      Impression    IMPRESSION:  1.  No evidence of acute pulmonary embolism.  2.  Mild opacities in both lung bases, right greater than left. These are nonspecific but favor infection.  3.  Emphysema with advanced destructive changes.  4.  Moderate fatty infiltration of the liver.  5.  Prostatectomy.     XR Elbow Bilateral G/E 3 Views    Narrative    EXAM: XR ELBOW BILATERAL G/E 3 VIEWS  LOCATION: [facility]  DATE: 4/19/2025    INDICATION: fall, pain, skin tears  COMPARISON: None.      Impression    IMPRESSION: Demineralization without displaced fracture. No left or right elbow joint effusion. Joint spaces are preserved. Small focus of heterotopic ossification adjacent to the right humeral epicondyle.

## 2025-04-20 NOTE — ED NOTES
[facility]  ED Nurse Handoff Report    ED Chief complaint: Alcohol Intoxication  . ED Diagnosis:   Final diagnoses:   COPD with acute exacerbation (H)   Pneumonia of both lower lobes due to infectious organism   Fall, initial encounter   Skin tear of elbow without complication, unspecified laterality, initial encounter   Alcohol use disorder   Ketosis (H)   Myocardial injury   Peripheral edema       Allergies:   Allergies   Allergen Reactions    Shrimp Diarrhea       Code Status: Full Code    Activity level - Baseline/Home:  independent.  Activity Level - Current:   standby.   Lift room needed: No.   Bariatric: No   Needed: No   Isolation: No.   Infection: Not Applicable.     Respiratory status: Room air    Vital Signs (within 30 minutes):   Vitals:    04/19/25 1815 04/19/25 1830 04/19/25 1845 04/19/25 1900   BP: 129/76 131/79 133/90 (!) 142/93   Pulse: 90 96 90 91   Resp: 19 19 14 15   TempSrc:       SpO2: (!) 88%  98%        Cardiac Rhythm:  ,      Pain level:    Patient confused: No.   Patient Falls Risk: nonskid shoes/slippers when out of bed.   Elimination Status: Has voided     Patient Report - Initial Complaint: confusion.   Focused Assessment: found to have PNA and ETOH intoxication.     Abnormal Results:   Labs Ordered and Resulted from Time of ED Arrival to Time of ED Departure   D DIMER QUANTITATIVE - Abnormal       Result Value    D-Dimer Quantitative 0.69 (*)    BASIC METABOLIC PANEL - Abnormal    Sodium 139      Potassium 5.6 (*)     Chloride 101      Carbon Dioxide (CO2) 26      Anion Gap 12      Urea Nitrogen 30.0 (*)     Creatinine 1.05      GFR Estimate 73      Calcium 8.5 (*)     Glucose 97     ETHYL ALCOHOL LEVEL - Abnormal    Alcohol ethyl 0.19 (*)    ISTAT GASES LACTATE VENOUS POCT - Abnormal    Lactic Acid POCT 3.0 (*)     Bicarbonate Venous POCT 31 (*)     O2 Sat, Venous POCT 42 (*)     pCO2 Venous POCT 60 (*)     pH Venous POCT 7.32      pO2 Venous POCT 26      LACTIC ACID WHOLE BLOOD WITH 1X REPEAT IN 2 HR WHEN >2 - Abnormal    Lactic Acid, Initial 3.0 (*)    KETONE BETA-HYDROXYBUTYRATE QUANTITATIVE, RAPID - Abnormal    Ketone (Beta-Hydroxybutyrate) Quantitative 0.53 (*)    TROPONIN T, HIGH SENSITIVITY - Abnormal    Troponin T, High Sensitivity 28 (*)    MAGNESIUM - Normal    Magnesium 1.9     NT PROBNP INPATIENT - Normal    N terminal Pro BNP Inpatient 436     INFLUENZA A/B, RSV AND SARS-COV2 PCR - Normal    Influenza A PCR Negative      Influenza B PCR Negative      RSV PCR Negative      SARS CoV2 PCR Negative     POTASSIUM - Normal    Potassium 4.6     AST - Normal    AST 36     HEPATIC FUNCTION PANEL    Protein Total 6.6      Albumin 4.1      Bilirubin Total 0.5      Alkaline Phosphatase 54      AST        ALT 21      Bilirubin Direct <0.08     TROPONIN T, HIGH SENSITIVITY    Troponin T, High Sensitivity       CBC WITH PLATELETS AND DIFFERENTIAL    WBC Count 4.3      RBC Count 4.73      Hemoglobin 14.7      Hematocrit 43.6      MCV 92      MCH 31.1      MCHC 33.7      RDW 14.2      Platelet Count 194      % Neutrophils 61      % Lymphocytes 27      % Monocytes 8      % Eosinophils 3      % Basophils 1      % Immature Granulocytes 1      NRBCs per 100 WBC 0      Absolute Neutrophils 2.6      Absolute Lymphocytes 1.2      Absolute Monocytes 0.4      Absolute Eosinophils 0.1      Absolute Basophils 0.0      Absolute Immature Granulocytes 0.0      Absolute NRBCs 0.0     KETONE BETA-HYDROXYBUTYRATE QUANTITATIVE, RAPID    Ketone (Beta-Hydroxybutyrate) Quantitative       LACTIC ACID WHOLE BLOOD   ETHYL ALCOHOL LEVEL        XR Elbow Bilateral G/E 3 Views   Final Result   IMPRESSION: Demineralization without displaced fracture. No left or right elbow joint effusion. Joint spaces are preserved. Small focus of heterotopic ossification adjacent to the right humeral epicondyle.      CT Chest (PE) Abdomen Pelvis w Contrast   Final Result   IMPRESSION:   1.  No evidence of acute  pulmonary embolism.   2.  Mild opacities in both lung bases, right greater than left. These are nonspecific but favor infection.   3.  Emphysema with advanced destructive changes.   4.  Moderate fatty infiltration of the liver.   5.  Prostatectomy.         Head CT w/o contrast   Final Result   IMPRESSION:   1.  No CT evidence for acute intracranial process.   2.  Brain atrophy and presumed chronic microvascular ischemic changes as above.          Treatments provided: labs, imaging, monitoring, medications.  Family Comments: at bedside.  OBS brochure/video discussed/provided to patient:  No  ED Medications:   Medications   doxycycline (VIBRAMYCIN) 100 mg vial to attach to  mL bag (100 mg Intravenous $New Bag 4/19/25 2244)   dextrose 5% and 0.9% NaCl infusion (has no administration in time range)   thiamine (B-1) tablet 100 mg (has no administration in time range)   folic acid (FOLVITE) tablet 1 mg (has no administration in time range)   magnesium sulfate 2 g in 50 mL sterile water intermittent infusion (has no administration in time range)   albuterol (PROVENTIL HFA/VENTOLIN HFA) inhaler (4 puffs Inhalation $Given 4/19/25 1812)   methylPREDNISolone Na Suc (solu-MEDROL) injection 125 mg (125 mg Intravenous $Given 4/19/25 1814)   sodium chloride 0.9% BOLUS 1,000 mL (0 mLs Intravenous Stopped 4/19/25 2118)   sodium chloride 0.9% BOLUS 500 mL (500 mLs Intravenous $New Bag 4/19/25 2134)   sodium chloride 0.9 % bag for CT scan flush (93 mLs Intravenous $Given 4/19/25 2100)   iopamidol (ISOVUE-370) solution 500 mL (92 mLs Intravenous $Given 4/19/25 2100)   cefTRIAXone (ROCEPHIN) 2 g vial to attach to  ml bag for ADULTS or NS 50 ml bag for PEDS (2 g Intravenous $New Bag 4/19/25 2204)       Drips infusing:  No  For the majority of the shift this patient was Green.   Interventions performed were NA.    Sepsis treatment initiated: No    Cares/treatment/interventions/medications to be completed following ED care:  Continued workup.     ED Nurse Name: Zhane Cardona RN  10:46 PM

## 2025-04-21 ENCOUNTER — APPOINTMENT (OUTPATIENT)
Dept: PHYSICAL THERAPY | Facility: CLINIC | Age: 80
DRG: 896 | End: 2025-04-21
Attending: INTERNAL MEDICINE
Payer: COMMERCIAL

## 2025-04-21 PROBLEM — F10.20 ALCOHOL USE DISORDER, MODERATE, DEPENDENCE (H): Chronic | Status: ACTIVE | Noted: 2025-04-20

## 2025-04-21 PROBLEM — F41.9 ANXIETY: Status: ACTIVE | Noted: 2019-05-23

## 2025-04-21 LAB
ATRIAL RATE - MUSE: 88 BPM
BLAIMP ISLT/SPM QL: NOT DETECTED
BLAKPC ISLT/SPM QL: NOT DETECTED
BLAOXA-48 ISLT/SPM QL: NOT DETECTED
BLAVIM ISLT/SPM QL: NOT DETECTED
DIASTOLIC BLOOD PRESSURE - MUSE: NORMAL MMHG
GLUCOSE BLDC GLUCOMTR-MCNC: 114 MG/DL (ref 70–99)
GLUCOSE BLDC GLUCOMTR-MCNC: 115 MG/DL (ref 70–99)
GLUCOSE BLDC GLUCOMTR-MCNC: 115 MG/DL (ref 70–99)
GLUCOSE BLDC GLUCOMTR-MCNC: 124 MG/DL (ref 70–99)
GLUCOSE BLDC GLUCOMTR-MCNC: 127 MG/DL (ref 70–99)
INTERPRETATION ECG - MUSE: NORMAL
NDM TARGET DNA: NOT DETECTED
P AXIS - MUSE: 42 DEGREES
PR INTERVAL - MUSE: 140 MS
QRS DURATION - MUSE: 108 MS
QT - MUSE: 394 MS
QTC - MUSE: 476 MS
R AXIS - MUSE: 82 DEGREES
SYSTOLIC BLOOD PRESSURE - MUSE: NORMAL MMHG
T AXIS - MUSE: -39 DEGREES
VENTRICULAR RATE- MUSE: 88 BPM

## 2025-04-21 PROCEDURE — 87798 DETECT AGENT NOS DNA AMP: CPT | Performed by: INTERNAL MEDICINE

## 2025-04-21 PROCEDURE — 250N000013 HC RX MED GY IP 250 OP 250 PS 637: Performed by: INTERNAL MEDICINE

## 2025-04-21 PROCEDURE — 99233 SBSQ HOSP IP/OBS HIGH 50: CPT | Performed by: INTERNAL MEDICINE

## 2025-04-21 PROCEDURE — 97530 THERAPEUTIC ACTIVITIES: CPT | Mod: GP | Performed by: PHYSICAL THERAPIST

## 2025-04-21 PROCEDURE — 250N000013 HC RX MED GY IP 250 OP 250 PS 637: Performed by: PSYCHIATRY & NEUROLOGY

## 2025-04-21 PROCEDURE — 97161 PT EVAL LOW COMPLEX 20 MIN: CPT | Mod: GP | Performed by: PHYSICAL THERAPIST

## 2025-04-21 PROCEDURE — 250N000011 HC RX IP 250 OP 636: Performed by: INTERNAL MEDICINE

## 2025-04-21 PROCEDURE — 120N000001 HC R&B MED SURG/OB

## 2025-04-21 PROCEDURE — 99222 1ST HOSP IP/OBS MODERATE 55: CPT | Performed by: PSYCHIATRY & NEUROLOGY

## 2025-04-21 RX ORDER — GABAPENTIN 300 MG/1
300 CAPSULE ORAL 3 TIMES DAILY
Status: DISCONTINUED | OUTPATIENT
Start: 2025-04-25 | End: 2025-04-22

## 2025-04-21 RX ORDER — MIRTAZAPINE 15 MG/1
15 TABLET, FILM COATED ORAL AT BEDTIME
Status: DISCONTINUED | OUTPATIENT
Start: 2025-04-21 | End: 2025-04-22 | Stop reason: HOSPADM

## 2025-04-21 RX ORDER — TRAZODONE HYDROCHLORIDE 50 MG/1
50 TABLET ORAL
Status: DISCONTINUED | OUTPATIENT
Start: 2025-04-21 | End: 2025-04-22 | Stop reason: HOSPADM

## 2025-04-21 RX ADMIN — MIRTAZAPINE 15 MG: 15 TABLET, FILM COATED ORAL at 21:00

## 2025-04-21 RX ADMIN — GABAPENTIN 900 MG: 300 CAPSULE ORAL at 05:30

## 2025-04-21 RX ADMIN — GABAPENTIN 900 MG: 300 CAPSULE ORAL at 21:00

## 2025-04-21 RX ADMIN — Medication 1 TABLET: at 08:54

## 2025-04-21 RX ADMIN — CEFTRIAXONE 1 G: 1 INJECTION, POWDER, FOR SOLUTION INTRAMUSCULAR; INTRAVENOUS at 22:35

## 2025-04-21 RX ADMIN — RIVAROXABAN 10 MG: 10 TABLET, FILM COATED ORAL at 17:44

## 2025-04-21 RX ADMIN — ALBUTEROL SULFATE 2 PUFF: 90 AEROSOL, METERED RESPIRATORY (INHALATION) at 21:09

## 2025-04-21 RX ADMIN — CLONIDINE HYDROCHLORIDE 0.1 MG: 0.1 TABLET ORAL at 17:44

## 2025-04-21 RX ADMIN — AMLODIPINE BESYLATE 5 MG: 5 TABLET ORAL at 08:54

## 2025-04-21 RX ADMIN — DOXYCYCLINE HYCLATE 100 MG: 100 CAPSULE ORAL at 08:54

## 2025-04-21 RX ADMIN — GABAPENTIN 900 MG: 300 CAPSULE ORAL at 13:33

## 2025-04-21 RX ADMIN — Medication 5 MG: at 21:00

## 2025-04-21 RX ADMIN — CLONIDINE HYDROCHLORIDE 0.1 MG: 0.1 TABLET ORAL at 08:54

## 2025-04-21 RX ADMIN — DOXYCYCLINE HYCLATE 100 MG: 100 CAPSULE ORAL at 21:00

## 2025-04-21 RX ADMIN — CLONIDINE HYDROCHLORIDE 0.1 MG: 0.1 TABLET ORAL at 01:08

## 2025-04-21 RX ADMIN — THIAMINE HYDROCHLORIDE 100 MG: 100 INJECTION, SOLUTION INTRAMUSCULAR; INTRAVENOUS at 08:58

## 2025-04-21 RX ADMIN — FOLIC ACID 1 MG: 5 INJECTION, SOLUTION INTRAMUSCULAR; INTRAVENOUS; SUBCUTANEOUS at 08:58

## 2025-04-21 ASSESSMENT — ACTIVITIES OF DAILY LIVING (ADL)
ADLS_ACUITY_SCORE: 29
ADLS_ACUITY_SCORE: 29
ADLS_ACUITY_SCORE: 33
ADLS_ACUITY_SCORE: 29
ADLS_ACUITY_SCORE: 33
ADLS_ACUITY_SCORE: 29
ADLS_ACUITY_SCORE: 29
ADLS_ACUITY_SCORE: 33
ADLS_ACUITY_SCORE: 29
ADLS_ACUITY_SCORE: 33
ADLS_ACUITY_SCORE: 33
ADLS_ACUITY_SCORE: 29

## 2025-04-21 NOTE — DISCHARGE INSTRUCTIONS
A Transition Clinic Referral has been made for you during your stay. They should contact you directly within 48 business hours to schedule this with you. If you have any questions or concerns regarding this referral, please use the contact information below:     Location: [address] [place] [address]  Phone: [phone]   Type: Therapy & Psychiatry - In-Person      ADDITIONAL SUPPORTS:  Call or text [phone] - National Suicide & Crisis Lifeline - if you feel like you may be in crisis or having thoughts of suicide.    [facility] [place] [facility] [place] ([facility]) provides support groups and educational programs. Visit www.namimn.org Helpline at 1-503.746.9696 or 288-506-9893 for further information.   [facility] ([employer] [place]) improves the lives of children and adults with mental illnesses and their families by providing free classes on mental illnesses andsupport groups for adults with mental illnesses, parents and family members.   For more information:  Phone: [phone]  Toll free: 4-504-OHAK-HELPS  Website: www.namihelps.org      The [place] [facility] provides a [id number] approach to mental health recovery, support and wellness. Calls are answered by our team of professionally trained Certified Peer Specialists, who have first hand experience living with a mental health condition.   Open Monday-Saturday, 5pm to 10pm. Call [phone].       You may contact the [facility] for brief, short-term solution focused therapy support with your mental health goals. Call [phone] for more information or to schedule. (Virtual Appointments)          [facility]: Thank you for your interest in [facility]. Currently, patients are experiencing long waits for intake when referred within [facility]. Please know that you may contact your insurance carrier member services to learn more about scheduling in  network therapy. Your insurance company will have lists of in network therapists that are not within [facility] and may have more immediate availability.       Get care started with an ongoing therapist by calling to schedule your intake at one of the following clinics:    [facility]: [phone]  [facility]  [phone]  [facility] [phone]  [facility] [phone]  [facility] [phone]  [facility] system  [phone]   [facility] in [facility] [phone]  [facility] [phone]    Call an [facility] Behavioral Coordinator at [phone] for assistance in scheduling mental health appointments (Psychiatry/medication management, therapy, support groups, neuropsych testing, intake for programmatic care such as IOP/PHP program, etc.)

## 2025-04-21 NOTE — PROGRESS NOTES
[facility]  Hospitalist Progress Note  Duran Vazquez MD 04/21/2025    Reason for Stay (Diagnosis): Alcohol withdrawal, suicidal ideation         Assessment and Plan:      Summary of Stay: Omar Adams is a 79 year old male with past medical history including PE on Xarelto, COPD, chronic pain, anxiety and depression with history of alcohol abuse and prior withdrawal admitted on 4/19/2025 with alcohol intoxication, suicidal ideation and multiple falls at home.  Family called EMS and he was brought to the emergency room.  He recently had been calling a suicide hotline as well.    Here alcohol level 0.13.  He did manifest some mild withdrawal and remains on CIWA protocol but overall remains reasonable.  Psychiatry has been consulted.    Problem List/Assessment and Plan:   Acute alcohol intoxication, history of alcohol abuse and no mild alcohol withdrawal with alcohol related ketosis: As above.  --Continue CIWA protocol, supportive cares with fluids and vitamins  --Gabapentin taper  --CD eval and treat    2.   Anxiety, depression and recent suicidal ideation: He actually called his brother to take away his guns prior to admission when he was feeling suicidal.  Ports he is not regularly taking any antidepressants.  --Currently has a sitter, suicide precautions and psychiatry consult pending  --He stopped taking his Celexa sometime ago.    3.   History of COPD, PE maintained on Xarelto and chronic hypoxemic respiratory failure frequently requiring 2 or 3 L/min at home:   -- Continue supplemental oxygen here  -- Schedule DuoNebs but okay to hold off on steroids  -- Continuing home Symbicort  -- Continue home Xarelto      4.   History of hypertension: Amlodipine    5.   Possible community-acquired pneumonia: Continuing ceftriaxone and doxycycline here, consider 5-day course.    6.  Hyponatremia: Will recheck.    7.  Hyperglycemia upon presentation: Seems to have resolved.  Continue sliding  scale for 1 day more.    DVT Prophylaxis: Pneumatic Compression Devices  Code Status: Full Code  Medically Ready for Discharge: Anticipated in 2-4 Days      Clinically Significant Risk Factors        # Hyperkalemia: Highest K = 5.6 mmol/L in last 2 days, will monitor as appropriate  # Hyponatremia: Lowest Na = 132 mmol/L in last 2 days, will monitor as appropriate  # Hypochloremia: Lowest Cl = 96 mmol/L in last 2 days, will monitor as appropriate  # Hypocalcemia: Lowest Ca = 8.5 mg/dL in last 2 days, will monitor and replace as appropriate         # Hypertension: Noted on problem list                # COPD: noted on problem list              Interval History (Subjective):      Still feels anxious, withdrawal seems to be getting somewhat better  Anticipate psychiatry consult today  Continue CIWA at least 1 day more                  Physical Exam:      Last Vital Signs:  /82 (BP Location: Right arm)   Pulse 91   Temp 97.5  F (36.4  C) (Temporal)   Resp 16   SpO2 93%     I/O last 3 completed shifts:  In: -   Out: 500 [Urine:500]    General: Alert, awake, no acute distress.  HEENT: NC/AT, eyes anicteric, external occular movements intact, face symmetric.  Cardiac: RRR, S1, S2.  No murmurs appreciated.  Pulmonary: Normal chest rise, normal work of breathing.  Lungs CTA BL  Abdomen: soft, non-tender, non-distended.  Bowel Sounds Present.  No guarding.  Extremities: no deformities.  Warm, well perfused.  Skin: no rashes or lesions noted.  Warm and Dry.  Neuro: No focal deficits noted.  Speech clear.  Coordination and strength grossly normal.  Psych: Appropriate affect.         Medications:      All current medications were reviewed with changes reflected in problem list.         Data:      All new lab and imaging data was reviewed.   Labs:  Recent Labs   Lab 04/21/25  0741 04/20/25  1746 04/20/25  1009   NA  --   --  132*   POTASSIUM  --   --  5.2   CHLORIDE  --   --  96*   CO2  --   --  23   ANIONGAP  --   --   "13   *   < > 317*   BUN  --   --  24.4*   CR  --   --  0.71   GFRESTIMATED  --   --  >90   EVE  --   --  8.6*    < > = values in this interval not displayed.     Recent Labs   Lab 04/20/25  1009   WBC 4.8   HGB 14.6   HCT 43.1   MCV 91        Recent Labs   Lab 04/19/25 2015 04/19/25  1747   AST 36  --    ALT  --  21   ALKPHOS  --  54   BILITOTAL  --  0.5     No results for input(s): \"INR\" in the last 168 hours.   Imaging:   Results for orders placed or performed during the hospital encounter of 04/19/25   Head CT w/o contrast    Narrative    EXAM: CT HEAD W/O CONTRAST  LOCATION: [facility]  DATE: 4/19/2025    INDICATION: fall, repsortedly on DOAC  COMPARISON: 11/26/2024.  TECHNIQUE: Routine CT Head without IV contrast. Multiplanar reformats. Dose reduction techniques were used.    FINDINGS:  INTRACRANIAL CONTENTS: No intracranial hemorrhage, extraaxial collection, or mass effect.  No CT evidence of acute infarct. Moderate presumed chronic small vessel ischemic changes. Mild generalized volume loss. No hydrocephalus.     VISUALIZED ORBITS/SINUSES/MASTOIDS: No intraorbital abnormality. No paranasal sinus mucosal disease. No middle ear or mastoid effusion.    BONES/SOFT TISSUES: No acute abnormality.      Impression    IMPRESSION:  1.  No CT evidence for acute intracranial process.  2.  Brain atrophy and presumed chronic microvascular ischemic changes as above.   CT Chest (PE) Abdomen Pelvis w Contrast    Narrative    EXAM: CT CHEST PE ABDOMEN PELVIS W CONTRAST  LOCATION: [facility]  DATE: 4/19/2025    INDICATION: Shortness of breath, hypoxia, BLE edema, h o VTE  COMPARISON: 8/7/2023  TECHNIQUE: CT chest pulmonary angiogram and routine CT abdomen pelvis with IV contrast. Arterial phase through the chest and venous phase through the abdomen and pelvis. Multiplanar reformats and MIP reconstructions were performed. Dose reduction   techniques were used. "   CONTRAST: 92mL Isovue 370    FINDINGS:  ANGIOGRAM CHEST: No evidence of acute pulmonary embolism. Mild calcified plaque in the thoracic aorta. No evidence of dissection.     LUNGS AND PLEURA: Emphysema with advanced destructive changes. A few scattered nodules measuring up to 3 mm remain unchanged and can be considered benign. No follow-up needed per Fleischner Society guidelines. There are mild opacities in both lung bases,   right greater than left. These are nonspecific but favor infection. No consolidation. No pleural effusions.    MEDIASTINUM/AXILLAE: Normal heart size. No pericardial effusions. No adenopathy.    CORONARY ARTERY CALCIFICATION: Severe.    HEPATOBILIARY: Moderate fatty infiltration of the liver. Unchanged 3.7 cm cyst. No follow-up needed.    PANCREAS: Normal.    SPLEEN: Normal.    ADRENAL GLANDS: Normal.    KIDNEYS/BLADDER: Normal.    BOWEL: Normal.    LYMPH NODES: Normal.    VASCULATURE: Diffuse calcified aortoiliac atheromatous plaque. The IVC and iliac veins are patent.    PELVIC ORGANS: Prostatectomy.    MUSCULOSKELETAL: Partially visualized postoperative changes in the proximal left humerus. Bones are demineralized. Compression deformities involving T7 and T8 are unchanged. Additional compression deformities of the lumbar vertebral bodies were not all   included on the prior exam but are likely chronic.      Impression    IMPRESSION:  1.  No evidence of acute pulmonary embolism.  2.  Mild opacities in both lung bases, right greater than left. These are nonspecific but favor infection.  3.  Emphysema with advanced destructive changes.  4.  Moderate fatty infiltration of the liver.  5.  Prostatectomy.     XR Elbow Bilateral G/E 3 Views    Narrative    EXAM: XR ELBOW BILATERAL G/E 3 VIEWS  LOCATION: [facility]  DATE: 4/19/2025    INDICATION: fall, pain, skin tears  COMPARISON: None.      Impression    IMPRESSION: Demineralization without displaced fracture. No left or  right elbow joint effusion. Joint spaces are preserved. Small focus of heterotopic ossification adjacent to the right humeral epicondyle.         Duran Vazquez MD     I've spent 60 minutes in chart review, ordering medications and tests, obtaining additional history as needed, evaluating the patient and in documentation for this encounter.

## 2025-04-21 NOTE — PLAN OF CARE
"A&Ox4. Contact precautions maintained. VSS on room air. Tele SR. Cont pulse ox. LS diminished, SOB with exertion. CIWAs 2 and 1 this shift. Was a bit sad/tearful this am. Emotional support provided. Feeling better now. Has chronic back pain, declined prn tylenol. Baseline n/t to L hand. On po doxycycline and IV rocephin for abx. IV SL. Tolerating regular diet. Blood sugars 115 and 127 this shift. Up ax1 gb/walker. Voiding appropriately. Psych consult. Discharge plan is TBD.    Goal Outcome Evaluation:      Plan of Care Reviewed With: patient    Overall Patient Progress: no changeOverall Patient Progress: no change       Problem: Adult Inpatient Plan of Care  Goal: Plan of Care Review  Description: The Plan of Care Review/Shift note should be completed every shift.  The Outcome Evaluation is a brief statement about your assessment that the patient is improving, declining, or no change.  This information will be displayed automatically on your shiftnote.  Outcome: Progressing  Flowsheets (Taken 4/21/2025 1229)  Plan of Care Reviewed With: patient  Overall Patient Progress: no change  Goal: Patient-Specific Goal (Individualized)  Description: You can add care plan individualizations to a care plan. Examples of Individualization might be:  \"Parent requests to be called daily at 9am for status\", \"I have a hard time hearing out of my right ear\", or \"Do not touch me to wake me up as it startlesme\".  Outcome: Progressing  Goal: Absence of Hospital-Acquired Illness or Injury  Outcome: Progressing  Intervention: Identify and Manage Fall Risk  Recent Flowsheet Documentation  Taken 4/21/2025 0907 by Sushma Miranda, RN  Safety Promotion/Fall Prevention: safety round/check completed  Intervention: Prevent Skin Injury  Recent Flowsheet Documentation  Taken 4/21/2025 0907 by Sushma Miranda, RN  Skin Protection: adhesive use limited  Intervention: Prevent Infection  Recent Flowsheet Documentation  Taken 4/21/2025 0907 by Ruben" Sushma VIGIL RN  Infection Prevention:   rest/sleep promoted   single patient room provided   personal protective equipment utilized   hand hygiene promoted  Goal: Optimal Comfort and Wellbeing  Outcome: Progressing  Intervention: Monitor Pain and Promote Comfort  Recent Flowsheet Documentation  Taken 4/21/2025 0851 by Sushma Miranda RN  Pain Management Interventions:   medication offered but refused   rest  Intervention: Provide Person-Centered Care  Recent Flowsheet Documentation  Taken 4/21/2025 0931 by Sushma Miranda RN  Trust Relationship/Rapport:   emotional support provided   empathic listening provided   reassurance provided   thoughts/feelings acknowledged  Taken 4/21/2025 0907 by Sushma Miranda RN  Trust Relationship/Rapport: care explained  Goal: Readiness for Transition of Care  Outcome: Progressing     Problem: Delirium  Goal: Optimal Coping  Outcome: Progressing  Goal: Improved Behavioral Control  Outcome: Progressing  Intervention: Minimize Safety Risk  Recent Flowsheet Documentation  Taken 4/21/2025 0931 by Sushma Miranda RN  Trust Relationship/Rapport:   emotional support provided   empathic listening provided   reassurance provided   thoughts/feelings acknowledged  Taken 4/21/2025 0907 by Sushma Miranda, RN  Enhanced Safety Measures:   assistive devices when indicated   room near unit station  Trust Relationship/Rapport: care explained  Goal: Improved Attention and Thought Clarity  Outcome: Progressing  Goal: Improved Sleep  Outcome: Progressing

## 2025-04-21 NOTE — PROGRESS NOTES
04/21/25 1346   Appointment Info   Signing Clinician's Name / Credentials (PT) Avi Strauss DPT   Living Environment   Living Environment Comments Pt reports living alone in condo, no stairs to manage. Reports recent use of 4ww for balance, activity tolerance.   Self-Care   Usual Activity Tolerance moderate   Current Activity Tolerance moderate   Equipment Currently Used at Home walker, rolling   Fall history within last six months yes   Number of times patient has fallen within last six months 2  (falling off EOB)   Activity/Exercise/Self-Care Comment reports recent acquiring of home 02 concentrator   General Information   Onset of Illness/Injury or Date of Surgery 04/19/25   Referring Physician Brendan Bejarano MD   Patient/Family Therapy Goals Statement (PT) To improve mobility   Pertinent History of Current Problem (include personal factors and/or comorbidities that impact the POC) Per H&P, pt is a 79 year old male with  past medical history including PE on Xarelto, COPD, chronic pain, anxiety and depression with history of alcohol abuse and prior withdrawal admitted on 4/19/2025 with alcohol intoxication, suicidal ideation and multiple falls at home.   Existing Precautions/Restrictions fall   Cognition   Affect/Mental Status (Cognition) WFL   Orientation Status (Cognition) oriented x 4   Follows Commands (Cognition) WFL   Pain Assessment   Patient Currently in Pain No   Range of Motion (ROM)   ROM Comment B LE WFL   Strength (Manual Muscle Testing)   Strength (Manual Muscle Testing) Deficits observed during functional mobility   Strength Comments decreased activity tolerance   Bed Mobility   Comment, (Bed Mobility) not assessed at evalutions   Transfers   Comment, (Transfers) SBA sit <> stand with FWW   Gait/Stairs (Locomotion)   Distance in Feet (Gait) 20   Pattern (Gait) step-through   Deviations/Abnormal Patterns (Gait) base of support, wide;gait speed decreased;stride length decreased    Comment, (Gait/Stairs) CGA with FWW   Balance   Balance Comments good sitting; fair+ standing with FWW   Sensory Examination   Sensory Perception Comments repots LE neuropathies at baseline   Clinical Impression   Criteria for Skilled Therapeutic Intervention Yes, treatment indicated   PT Diagnosis (PT) impaired functional mobility   Influenced by the following impairments decreased balance, reduced activity tolerance   Functional limitations due to impairments decreased bed mobility, transfers, ambulation   Clinical Presentation (PT Evaluation Complexity) stable   Clinical Presentation Rationale Clinical judgement   Clinical Decision Making (Complexity) low complexity   Planned Therapy Interventions (PT) balance training;bed mobility training;gait training;home exercise program;neuromuscular re-education;patient/family education;strengthening;progressive activity/exercise;postural re-education   Risk & Benefits of therapy have been explained evaluation/treatment results reviewed;care plan/treatment goals reviewed;risks/benefits reviewed;current/potential barriers reviewed;participants voiced agreement with care plan;participants included;patient   PT Total Evaluation Time   PT Eval, Low Complexity Minutes (81182) 10   Physical Therapy Goals   PT Frequency Daily   PT Predicted Duration/Target Date for Goal Attainment 04/25/25   PT Goals Bed Mobility;Transfers;Gait;Aerobic Activity   PT: Bed Mobility Independent;Supine to/from sit   PT: Transfers Modified independent;Sit to/from stand;Assistive device   PT: Gait Modified independent;Assistive device;Greater than 200 feet   PT: Perform aerobic activity with stable cardiovascular response continuous activity;10 minutes   PT Discharge Planning   PT Plan monitor 02, progress amb tolerance, LE strengthening, balance activity   PT Discharge Recommendation (DC Rec) home with home care physical therapy   PT Rationale for DC Rec Anticipate that patient will be able to  return home with HHPT to progress activity. Would recommend use of walker for all mobility. Pt mostly limited 2/2 reduced activity tolerance, CISSE at this time.   PT Brief overview of current status Ax1 with FWW   PT Total Distance Amb During Session (feet) 160   Physical Therapy Time and Intention   Timed Code Treatment Minutes 16   Total Session Time (sum of timed and untimed services) 26

## 2025-04-21 NOTE — PROGRESS NOTES
CD Consult acknowledged.  Attempted to meet with pt x2 but was informed by unit staff that pt has a visitor and is not available.  CD Consult team will attempt to see pt again tomorrow 4/22/25.    If pt discharges prior to being seen and has active insurance they may also schedule an assessment on an outpatient basis by calling [facility] at [phone].     MONICA Telles, [facility]  Substance Use Disorder Evaluation Counselor  Email: [email or web address]

## 2025-04-21 NOTE — PROGRESS NOTES
CP- and Candida auris screening information    This patient recieved healthcare outside of the [place] and [place] in the prior 12 months. and is potentially at a high risk for carrying CP- and/or Candida auris. The [place] Department of Health ([facility]) and CDC recommend that we test this patient to prevent the spread of these organisms within our healthcare facility. Testing is voluntary and includes collecting an axilla and groin swab for C. auris and a rectal swab for CP-. Due to the potential transmission of these organisms in healthcare settings, Infection Prevention requires that this patient be placed in a private room on Contact Precautions until testing is complete. While the Candida auris testing is pending, bleach or an approved disinfectant (e.g. PDI Prime) should be used clean the patient s room and equipment.      Please notify Infection Prevention if the patient declines testing.  Additional information and resources can be found on the Infection Prevention [employer]point page.     4/21/2025  Deepika Chambers DNP, MPH, PHN, RN, CIC  Infection Prevention    [facility]  Office: [phone]  Direct: [phone]  [email or web address]

## 2025-04-21 NOTE — PLAN OF CARE
"Goal Outcome Evaluation:      Plan of Care Reviewed With: patient    Overall Patient Progress: improvingOverall Patient Progress: improving         [id number]  Denies pain overnight. RA. Voiding well up to bathroom. CIWA 1, 2, 0 IV SL. Tele was SR with inverted Ts, did have 7 beats V tach at 0502  Rocephin infused.   Discharge tbd      Problem: Adult Inpatient Plan of Care  Goal: Plan of Care Review  Description: The Plan of Care Review/Shift note should be completed every shift.  The Outcome Evaluation is a brief statement about your assessment that the patient is improving, declining, or no change.  This information will be displayed automatically on your shiftnote.  Outcome: Progressing  Flowsheets (Taken 4/21/2025 2060)  Plan of Care Reviewed With: patient  Overall Patient Progress: improving  Goal: Patient-Specific Goal (Individualized)  Description: You can add care plan individualizations to a care plan. Examples of Individualization might be:  \"Parent requests to be called daily at 9am for status\", \"I have a hard time hearing out of my right ear\", or \"Do not touch me to wake me up as it startlesme\".  Outcome: Progressing  Goal: Absence of Hospital-Acquired Illness or Injury  Outcome: Progressing  Intervention: Identify and Manage Fall Risk  Recent Flowsheet Documentation  Taken 4/20/2025 2200 by Mark Louise, RN  Safety Promotion/Fall Prevention:   activity supervised   assistive device/personal items within reach   nonskid shoes/slippers when out of bed   patient and family education   safety round/check completed   clutter free environment maintained  Intervention: Prevent Skin Injury  Recent Flowsheet Documentation  Taken 4/20/2025 2200 by Mark Louise, RN  Body Position: position changed independently  Taken 4/20/2025 2133 by Mark Louise, RN  Body Position: position changed independently  Goal: Optimal Comfort and Wellbeing  Outcome: Progressing  Goal: Readiness for Transition of Care  Outcome: " Progressing  Problem: Delirium  Goal: Optimal Coping  Outcome: Progressing  Goal: Improved Behavioral Control  Outcome: Progressing  Intervention: Minimize Safety Risk  Recent Flowsheet Documentation  Taken 4/20/2025 2200 by Mark Louise RN  Enhanced Safety Measures: pain management  Goal: Improved Attention and Thought Clarity  Outcome: Progressing  Goal: Improved Sleep  Outcome: Progressing

## 2025-04-21 NOTE — CONSULTS
"[facility]     INITIAL PSYCHIATRY   CONSULT     DATE OF SERVICE   4/21/2025       IDENTIFICATION   Omar Adams  Age: 79 year old  MRN# [id number]   YOB: 1945   LOS: 2       CHIEF COMPLAINT   \"I had a meltdown.\"       CONSULT REQUEST BY   Brendan Bejarano re:  Suicide ideation       HISTORY OF PRESENT ILLNESS   This is a 79 year old male with history of depression, anxiety, alcohol use disorder.  Does not have prior history of psychiatric hospitalization or suicide attempt.  Does have remote history of inpatient and outpatient treatment.  Most recently, followed by PCP for psychiatric medication management with Celexa, but self-discontinued within the past 2 months due to issues of efficacy.  Now, admitted secondary exacerbation of depression with anxiety and suicidal ideation occurring in context of alcohol use.  Patient is voluntary.    Consult to psychiatry regarding suicidal ideation.    Care coordination performed in detail.  Includes, review of mental health information in electronic medical record.  Most recent visit with psychologist on 1/14/2025, prior to planned went to visit to [facility].  On 8/8/2024, annual wellness exam completed.  Documented history of DANICA and recommendation to continue citalopram 10 mg daily and use hydroxyzine as needed.  No concerns reported related to depression or alcohol use disorder.    Upon interview, the patient is cooperative on approach.  Visit performed in patient's room on the unit.  Consent is given to evaluate.  Patient is voluntary.  Patient is made aware of plan to coordinate cares with treatment team.    Patient evaluation to review events into presentation and to clarify information as per chart review.  Patient acknowledges limited mental health history.  Denies prior psychiatric hospitalization, suicide attempt, or significant family history.  Does acknowledge complicating factors of alcohol use requiring inpatient " "treatment at [facility] and outpatient treatment previously.  Able to establish sobriety for upwards to 13 years, given past work as a  for [employer].  Most recently, medication management through PCP.  History of medication trials with antidepressants; able to recall mirtazapine and citalopram.  Admits to self discontinuation of citalopram, due to lack of efficacy.  Further history of episodic and mentals management while visiting [facility] during winter.  Patient provides records most recently dated 2023 to include use of benzodiazepine.    Advancing to presentation, include events after returning back from [facility] on 4/15/2024.  Describes having a, \"garcia with alcohol.\"  Describes intake of alcohol x 3 to 4 days of approximately half liter.  Precipitating presentation includes interaction with brother and family leading to arguments of political to content.    Reportedly, patient made statement of suicidality to include use of a gun.  Acknowledges intoxication while making statement and expresses regret and remorse.  Denies suicide attempt history.  Future-oriented.  Gun access removed by brother.  Patient is on a one-to-one and discontinued as patient denies suicidal ideation while hospitalized and efforts to mitigate risk addressed.  Orders updated.    Further precipitating factor acknowledged to include stopping PTA citalopram for depression/anxiety.  Describes unclear efficacy with medication, therefore, discontinued.  Medication trials of previous use discussed and consents retrial.  Reports efforts of psychotherapy in the community, but expresses concern of lack of benefit and/or report with provider and requests referral to a new provider for individual therapy after discharge.    Patient denies substances of use aside from alcohol.  Admits to use of alcohol or 3 days, with use in a, \"garcia,\" pattern.  Most recent episode also occurred with brother.  Motivated for sobriety.  Declines KAIA " treatment.  Describes 13 years of sobriety.  Denies further substances of use.    On further psychiatric review of symptoms, endorses depressed mood with anxiety.  Denies britt.  Impairment of sleep and energy.  Denies anhedonia.  Denies psychosis.  Further psychiatric review of symptoms is negative.    Treatment plan discussed.  Patient is voluntary.  Risk assessment performed in detail and one-to-one discontinued as patient contracts for safety while hospitalized.  Consents to retrial mirtazapine for depression, anxiety and augment with gabapentin as part of CIWA taper for alcohol withdrawal management.  KAIA evaluation pending at time of visit.  Patient request referral to mental health providers in the community after discharge and coordinated for placement in [facility].        Review of external notes and/or information:  I personally reviewed notes from the patient's admit note dated 4/19. This provided me with information regarding patient's recent clinical course.     I personally reviewed the patient's chart, including available medication list and available past medical history, past surgical history, family history, and social history.        CHEMICAL DEPENDENCY HISTORY   History   Drug Use Unknown     Social History    Substance and Sexual Activity      Alcohol use: Not Currently    History   Smoking Status    Some Days    Packs/day: 0.00    Types: Cigarettes   Smokeless Tobacco    Never     Treatment: History of inpatient and outpatient x 1 each.  Detox: Denies withdrawal seizure.  Legal: Denies DUI history.       PAST PSYCHIATRIC HISTORY   Psychiatrist: PCP  Reports seeing mental health provider in [facility].  Records reviewed with the most recent date 2023.  Therapist: Cody Martinez; last visit 1/14/2025  Hospitalizations: Denies  History of Commitment: None reported  Past Medications:   Citalopram, mirtazapine  Alprazolam  Melatonin  TMS/ECT/Ketamine:  No  Suicide Attempts/Gun Access: Denies suicide attempts.   Reports brother removed guns from home.       PAST MEDICAL HISTORY   Past Medical History:   Diagnosis Date    COPD (chronic obstructive pulmonary disease) (H)     COPD (chronic obstructive pulmonary disease) (H)     Proximal humerus fracture 09/27/2020    Pulmonary embolism (H) 2022     Past Surgical History:   Procedure Laterality Date    PROSTATECTOMY       Primary Care Provider: No Ref-Primary, Physician  Medications:   Current Facility-Administered Medications   Medication Dose Route Frequency Provider Last Rate Last Admin    amLODIPine (NORVASC) tablet 5 mg  5 mg Oral Daily Brendan Bejarano MD   5 mg at 04/21/25 0854    cefTRIAXone (ROCEPHIN) 1 g vial to attach to  mL bag for ADULTS or NS 50 mL bag for PEDS  1 g Intravenous Q24H Brendan Bejarano MD   1 g at 04/20/25 2133    cloNIDine (CATAPRES) tablet 0.1 mg  0.1 mg Oral Q8H Brendan Bejarano MD   0.1 mg at 04/21/25 0854    doxycycline hyclate (VIBRAMYCIN) capsule 100 mg  100 mg Oral Q12H [facility] (08/20) Brendan Bejarano MD   100 mg at 04/21/25 0854    folic acid injection 1 mg  1 mg Intravenous Daily Brendan Bejarano MD   1 mg at 04/21/25 0858    [START ON 4/27/2025] gabapentin (NEURONTIN) capsule 100 mg  100 mg Oral Q8H Brendan Bejarano MD        [START ON 4/25/2025] gabapentin (NEURONTIN) capsule 300 mg  300 mg Oral Q8H Brendan Bejarano MD        [START ON 4/23/2025] gabapentin (NEURONTIN) capsule 600 mg  600 mg Oral Q8H Brendan Bejarano MD        gabapentin (NEURONTIN) capsule 900 mg  900 mg Oral Q8H Brendan Bejarano MD   900 mg at 04/21/25 0530    insulin aspart (NovoLOG) injection (RAPID ACTING)  1-7 Units Subcutaneous TID AC Ramón Watkins MD   1 Units at 04/20/25 1755    insulin aspart (NovoLOG) injection (RAPID ACTING)  1-5 Units Subcutaneous At Bedtime Ramón Watkins MD        multivitamin w/minerals (THERA-VIT-M) tablet 1 tablet  1 tablet Oral Daily Brendan Bejarano MD   1 tablet at 04/21/25 0854    rivaroxaban  ANTICOAGULANT (XARELTO) tablet 10 mg  10 mg Oral Daily with supper Brendan Bejarano MD   10 mg at 04/20/25 1647    sodium chloride (PF) 0.9% PF flush 3 mL  3 mL Intracatheter Q8H ENEIDA Brendan Bejarano MD   3 mL at 04/21/25 0530    thiamine (B-1) injection 100 mg  100 mg Intravenous Daily Brendan Bejarano MD   100 mg at 04/21/25 0858     Medications as needed:   Current Facility-Administered Medications   Medication Dose Route Frequency Provider Last Rate Last Admin    acetaminophen (TYLENOL) tablet 650 mg  650 mg Oral Q4H PRN Brendan Bejarano MD   650 mg at 04/20/25 1647    Or    acetaminophen (TYLENOL) Suppository 650 mg  650 mg Rectal Q4H PRN Brendan Bejarano MD        albuterol (PROVENTIL HFA/VENTOLIN HFA) inhaler  1-2 puff Inhalation Q4H PRN Brendan Bejarano MD        calcium carbonate (TUMS) chewable tablet 1,000 mg  1,000 mg Oral 4x Daily PRN Brendan Bejarano MD        glucose gel 15-30 g  15-30 g Oral Q15 Min PRN Ramón Watkins MD        Or    dextrose 50 % injection 25-50 mL  25-50 mL Intravenous Q15 Min PRN Ramón Watkins MD        Or    glucagon injection 1 mg  1 mg Subcutaneous Q15 Min PRN Ramón Watkins MD        diazepam (VALIUM) tablet 10 mg  10 mg Oral Q30 Min PRN Brendan Bejarano MD        Or    diazepam (VALIUM) injection 5-10 mg  5-10 mg Intravenous Q30 Min PRN Brendan Bejarano MD        flumazenil (ROMAZICON) injection 0.2 mg  0.2 mg Intravenous q1 min prn Brendan Bejarano MD        OLANZapine zydis (zyPREXA) ODT tab 5-10 mg  5-10 mg Oral Q6H PRN Brendan Bejarano MD        Or    haloperidol lactate (HALDOL) injection 2.5-5 mg  2.5-5 mg Intravenous Q6H PRN Brendan Bejarano MD        lidocaine (LMX4) cream   Topical Q1H PRN Brendan Bejarano MD        lidocaine 1 % 0.1-1 mL  0.1-1 mL Other Q1H PRN Brendan Bejarano MD        melatonin tablet 5 mg  5 mg Oral QPM PRN Brendan Bejarano MD        ondansetron (ZOFRAN ODT) ODT tab 4 mg  4 mg Oral Q6H PRN  Brendan Bejarano MD        Or    ondansetron (ZOFRAN) injection 4 mg  4 mg Intravenous Q6H PRN Brendan Bejarano MD        Patient is already receiving anticoagulation with heparin, enoxaparin (LOVENOX), warfarin (COUMADIN)  or other anticoagulant medication   Does not apply Continuous PRN Brendan Bejarano MD        senna-docusate (SENOKOT-S/PERICOLACE) 8.6-50 MG per tablet 1 tablet  1 tablet Oral BID PRN Brendan Bejarano MD   1 tablet at 04/20/25 0834    Or    senna-docusate (SENOKOT-S/PERICOLACE) 8.6-50 MG per tablet 2 tablet  2 tablet Oral BID PRN Brendan Bejarano MD        sodium chloride (PF) 0.9% PF flush 3 mL  3 mL Intracatheter q1 min prn Brendan Bejarano MD         ALLERGIES: Shrimp    Reviewed in detail and see ED and Intake for full details and history.       MEDICATIONS   Medications Prior to Admission   Medication Sig Dispense Refill Last Dose/Taking    albuterol (PROAIR HFA/PROVENTIL HFA/VENTOLIN HFA) 108 (90 Base) MCG/ACT inhaler Inhale 1-2 puffs into the lungs   Past Month    hydrOXYzine HCl (ATARAX) 25 MG tablet Take 50 mg by mouth nightly as needed for other (Insomnia).   4/18/2025 Evening    pravastatin (PRAVACHOL) 10 MG tablet Take 1 tablet by mouth daily.   4/19/2025 Morning    rivaroxaban ANTICOAGULANT (XARELTO) 10 MG TABS tablet Take 10 mg by mouth.   4/18/2025 Evening    STIOLTO RESPIMAT 2.5-2.5 MCG/ACT AERS Inhale 2 puffs into the lungs daily.   4/19/2025 Morning      Medication adherence issues: MS Med Adherence Y/N: Self-discontinued citalopram due to issues of efficacy.  Medication side effects: MEDICATION SIDE EFFECTS: no side effects reported  Benefit: Yes / No: Does not know       ROS   The 10 point Review of Systems is negative other than noted in the HPI or here.       FAMILY HISTORY   No family history on file.     Psychiatric: None reported  Chemical: None reported  Suicide: None reported       SOCIAL HISTORY   Social History     Socioeconomic History    Marital  status:      Spouse name: Not on file    Number of children: Not on file    Years of education: Not on file    Highest education level: Not on file   Occupational History    Not on file   Tobacco Use    Smoking status: Some Days     Types: Cigarettes    Smokeless tobacco: Never   Substance and Sexual Activity    Alcohol use: Not Currently    Drug use: Not Currently    Sexual activity: Not on file   Other Topics Concern    Not on file   Social History Narrative    Not on file     Social Drivers of Health     Financial Resource Strain: Low Risk  (4/20/2025)    Financial Resource Strain     Within the past 12 months, have you or your family members you live with been unable to get utilities (heat, electricity) when it was really needed?: No   Food Insecurity: Low Risk  (4/20/2025)    Food Insecurity     Within the past 12 months, did you worry that your food would run out before you got money to buy more?: No     Within the past 12 months, did the food you bought just not last and you didn t have money to get more?: No   Transportation Needs: Low Risk  (4/20/2025)    Transportation Needs     Within the past 12 months, has lack of transportation kept you from medical appointments, getting your medicines, non-medical meetings or appointments, work, or from getting things that you need?: No   Physical Activity: Not on file   Stress: Not on file   Social Connections: Unknown (12/13/2023)    Received [facility]    Social Connections     Frequency of Communication with Friends and Family: Not on file   Interpersonal Safety: Low Risk  (4/20/2025)    Interpersonal Safety     Do you feel physically and emotionally safe where you currently live?: Yes     Within the past 12 months, have you been hit, slapped, kicked or otherwise physically hurt by someone?: No     Within the past 12 months, have you been humiliated or emotionally abused in other ways by your partner or ex-partner?:  No   Housing Stability: Low Risk  (4/20/2025)    Housing Stability     Do you have housing? : Yes     Are you worried about losing your housing?: No     Occupation: Retired  for [employer].  Marital Status:   Children: 1 daughter and 2 adopted  Legal: None reported  Living Situation: Living arrangements - the patient lives alone  ASSETS/STRENGTHS: Supportive family and able to seek treatment when needed.       MENTAL STATUS EXAM   Appearance: Cooperative  Mood:  Mood: Dysphoric  Affect: tearful  was congruent to speech  Suicidal Ideation: PRESENT / ABSENT: absent denies suicidal ideation with plan or intent.  Remorse of suicide statements/behaviors.  Future-oriented for family.  Reports removal of guns by brother.  Homicidal Ideation: PRESENT / ABSENT: absent   Thought process:  Linear  and no EULA.  Thought content: denies suicidal ideation, violent ideation, and psychosis .   Fund of Knowledge: Sufficient  Attention/Concentration: Fair  Language ability:  Intact  Memory: Sufficient  Insight:  fair.  Judgement: appropriate and adequate for safety  Orientation: Yes, x4  Psychomotor Behavior: slowed    Muscle Strength and Tone: MuscleStrength: Normal  Gait and Station:  With assist       PHYSICAL EXAM   Vitals: /82 (BP Location: Right arm)   Pulse 91   Temp 97.5  F (36.4  C) (Temporal)   Resp 16   SpO2 93%     Weight:   0 lbs 0 oz    There is no height or weight on file to calculate BMI.    Physical exam as per Hospitalist, Brendan Bejarano MD. Dated 4/19/2025:    General Appearance:  Disheveled, smelling of ETOH, NCAT  Respiratory: diminished at lung bases bilaterally  Cardiovascular: tachycardic, regular  GI: soft, NT, +BS  Skin: warm and dry, pedal and ankle edema  Other:  Moves all 4 ext, CN intact      I have reviewed the physical exam as documented by by the medical team and agree with findings and assessment and have no additional findings to add at this time.       LABS    personally reviewed.   Recent Results (from the past 48 hours)   D dimer quantitative    Collection Time: 04/19/25  5:47 PM   Result Value Ref Range    D-Dimer Quantitative 0.69 (H) 0.00 - 0.50 ug/mL FEU   Basic metabolic panel    Collection Time: 04/19/25  5:47 PM   Result Value Ref Range    Sodium 139 135 - 145 mmol/L    Potassium 5.6 (H) 3.4 - 5.3 mmol/L    Chloride 101 98 - 107 mmol/L    Carbon Dioxide (CO2) 26 22 - 29 mmol/L    Anion Gap 12 7 - 15 mmol/L    Urea Nitrogen 30.0 (H) 8.0 - 23.0 mg/dL    Creatinine 1.05 0.67 - 1.17 mg/dL    GFR Estimate 73 >60 mL/min/1.73m2    Calcium 8.5 (L) 8.8 - 10.4 mg/dL    Glucose 97 70 - 99 mg/dL   Hepatic function panel    Collection Time: 04/19/25  5:47 PM   Result Value Ref Range    Protein Total 6.6 6.4 - 8.3 g/dL    Albumin 4.1 3.5 - 5.2 g/dL    Bilirubin Total 0.5 <=1.2 mg/dL    Alkaline Phosphatase 54 40 - 150 U/L    AST      ALT 21 0 - 70 U/L    Bilirubin Direct <0.08 0.00 - 0.30 mg/dL   Troponin T, High Sensitivity    Collection Time: 04/19/25  5:47 PM   Result Value Ref Range    Troponin T, High Sensitivity     Magnesium    Collection Time: 04/19/25  5:47 PM   Result Value Ref Range    Magnesium 1.9 1.7 - 2.3 mg/dL   Nt probnp inpatient (BNP)    Collection Time: 04/19/25  5:47 PM   Result Value Ref Range    N terminal Pro BNP Inpatient 436 0 - 1,800 pg/mL   Ethyl Alcohol Level    Collection Time: 04/19/25  5:47 PM   Result Value Ref Range    Alcohol ethyl 0.19 (H) <=0.01 g/dL   CBC with platelets and differential    Collection Time: 04/19/25  5:47 PM   Result Value Ref Range    WBC Count 4.3 4.0 - 11.0 10e3/uL    RBC Count 4.73 4.40 - 5.90 10e6/uL    Hemoglobin 14.7 13.3 - 17.7 g/dL    Hematocrit 43.6 40.0 - 53.0 %    MCV 92 78 - 100 fL    MCH 31.1 26.5 - 33.0 pg    MCHC 33.7 31.5 - 36.5 g/dL    RDW 14.2 10.0 - 15.0 %    Platelet Count 194 150 - 450 10e3/uL    % Neutrophils 61 %    % Lymphocytes 27 %    % Monocytes 8 %    % Eosinophils 3 %    % Basophils 1 %    %  Immature Granulocytes 1 %    NRBCs per 100 WBC 0 <1 /100    Absolute Neutrophils 2.6 1.6 - 8.3 10e3/uL    Absolute Lymphocytes 1.2 0.8 - 5.3 10e3/uL    Absolute Monocytes 0.4 0.0 - 1.3 10e3/uL    Absolute Eosinophils 0.1 0.0 - 0.7 10e3/uL    Absolute Basophils 0.0 0.0 - 0.2 10e3/uL    Absolute Immature Granulocytes 0.0 <=0.4 10e3/uL    Absolute NRBCs 0.0 10e3/uL   Ketone Beta-Hydroxybutyrate Quantitative    Collection Time: 04/19/25  5:47 PM   Result Value Ref Range    Ketone (Beta-Hydroxybutyrate) Quantitative     Influenza A/B, RSV and SARS-CoV2 PCR (COVID-19) Nasopharyngeal    Collection Time: 04/19/25  5:51 PM    Specimen: Nasopharyngeal; Swab   Result Value Ref Range    Influenza A PCR Negative Negative    Influenza B PCR Negative Negative    RSV PCR Negative Negative    SARS CoV2 PCR Negative Negative   EKG 12-lead, tracing only    Collection Time: 04/19/25  5:52 PM   Result Value Ref Range    Systolic Blood Pressure  mmHg    Diastolic Blood Pressure  mmHg    Ventricular Rate 88 BPM    Atrial Rate 88 BPM    NE Interval 140 ms    QRS Duration 108 ms     ms    QTc 476 ms    P Axis 42 degrees    R AXIS 82 degrees    T Axis -39 degrees    Interpretation ECG       Sinus rhythm  Right bundle branch block  T wave abnormality, consider lateral ischemia  Abnormal ECG  When compared with ECG of 26-Nov-2024 23:39,  Questionable change in QRS axis  Confirmed by - EMERGENCY ROOM, PHYSICIAN ([id number]),  SONIA OLIVIER (Bree) on 4/21/2025 7:06:22 AM     iStat Gases (lactate) venous, POCT    Collection Time: 04/19/25  6:04 PM   Result Value Ref Range    Lactic Acid POCT 3.0 (H) 0.7 - 2.0 mmol/L    Bicarbonate Venous POCT 31 (H) 21 - 28 mmol/L    O2 Sat, Venous POCT 42 (L) 70 - 75 %    pCO2 Venous POCT 60 (H) 40 - 50 mm Hg    pH Venous POCT 7.32 7.32 - 7.43    pO2 Venous POCT 26 25 - 47 mm Hg   Potassium    Collection Time: 04/19/25  8:15 PM   Result Value Ref Range    Potassium 4.6 3.4 - 5.3 mmol/L   Lactic acid  whole blood with 1x repeat in 2 hr when >2    Collection Time: 04/19/25  8:15 PM   Result Value Ref Range    Lactic Acid, Initial 3.0 (H) 0.7 - 2.0 mmol/L   AST    Collection Time: 04/19/25  8:15 PM   Result Value Ref Range    AST 36 0 - 45 U/L   Ketone Beta-Hydroxybutyrate Quantitative    Collection Time: 04/19/25  8:15 PM   Result Value Ref Range    Ketone (Beta-Hydroxybutyrate) Quantitative 0.53 (H) <=0.30 mmol/L   Troponin T, High Sensitivity    Collection Time: 04/19/25  8:15 PM   Result Value Ref Range    Troponin T, High Sensitivity 28 (H) <=22 ng/L   Alcohol ethyl    Collection Time: 04/19/25  8:15 PM   Result Value Ref Range    Alcohol ethyl 0.13 (H) <=0.01 g/dL   Magnesium    Collection Time: 04/19/25  8:15 PM   Result Value Ref Range    Magnesium 1.8 1.7 - 2.3 mg/dL   Phosphorus    Collection Time: 04/19/25  8:15 PM   Result Value Ref Range    Phosphorus 2.6 2.5 - 4.5 mg/dL   Procalcitonin    Collection Time: 04/19/25  8:15 PM   Result Value Ref Range    Procalcitonin 0.03 <0.50 ng/mL   Lactic acid whole blood    Collection Time: 04/20/25 12:24 AM   Result Value Ref Range    Lactic Acid 3.1 (H) 0.7 - 2.0 mmol/L   Basic metabolic panel    Collection Time: 04/20/25 10:09 AM   Result Value Ref Range    Sodium 132 (L) 135 - 145 mmol/L    Potassium 5.2 3.4 - 5.3 mmol/L    Chloride 96 (L) 98 - 107 mmol/L    Carbon Dioxide (CO2) 23 22 - 29 mmol/L    Anion Gap 13 7 - 15 mmol/L    Urea Nitrogen 24.4 (H) 8.0 - 23.0 mg/dL    Creatinine 0.71 0.67 - 1.17 mg/dL    GFR Estimate >90 >60 mL/min/1.73m2    Calcium 8.6 (L) 8.8 - 10.4 mg/dL    Glucose 317 (H) 70 - 99 mg/dL   CBC with platelets    Collection Time: 04/20/25 10:09 AM   Result Value Ref Range    WBC Count 4.8 4.0 - 11.0 10e3/uL    RBC Count 4.75 4.40 - 5.90 10e6/uL    Hemoglobin 14.6 13.3 - 17.7 g/dL    Hematocrit 43.1 40.0 - 53.0 %    MCV 91 78 - 100 fL    MCH 30.7 26.5 - 33.0 pg    MCHC 33.9 31.5 - 36.5 g/dL    RDW 13.8 10.0 - 15.0 %    Platelet Count 205 150 -  "450 10e3/uL   Lactic Acid Whole Blood with 1X Repeat in 2 HR when >2    Collection Time: 04/20/25 10:09 AM   Result Value Ref Range    Lactic Acid, Initial 3.9 (H) 0.7 - 2.0 mmol/L   Magnesium    Collection Time: 04/20/25 10:09 AM   Result Value Ref Range    Magnesium 1.8 1.7 - 2.3 mg/dL   Lactic acid whole blood    Collection Time: 04/20/25 12:44 PM   Result Value Ref Range    Lactic Acid 2.3 (H) 0.7 - 2.0 mmol/L   Glucose by meter    Collection Time: 04/20/25  5:46 PM   Result Value Ref Range    GLUCOSE BY METER POCT 141 (H) 70 - 99 mg/dL   Glucose by meter    Collection Time: 04/20/25 10:20 PM   Result Value Ref Range    GLUCOSE BY METER POCT 162 (H) 70 - 99 mg/dL   Glucose by meter    Collection Time: 04/21/25  1:56 AM   Result Value Ref Range    GLUCOSE BY METER POCT 124 (H) 70 - 99 mg/dL   Glucose by meter    Collection Time: 04/21/25  7:41 AM   Result Value Ref Range    GLUCOSE BY METER POCT 115 (H) 70 - 99 mg/dL     No results found for: \"PHENYTOIN\", \"PHENOBARB\", \"VALPROATE\", \"CBMZ\"       ASSESSMENT   Consult to psychiatry regarding suicidal ideation.  Occurring in setting of known history of depression with anxiety.  Self discontinuation of PTA citalopram due to issues of efficacy and complicating factors of episodic alcohol use prior to admission.  Consents to medication management to target residual signs and symptoms of depression with anxiety.  Denies suicide ideation and able to state an appropriate crisis and relapse plan.  Future-oriented.  Accepting of referrals for ongoing mental health management but declines as CD treatment, time of visit.    Risk assessment performed in detail.  Efforts to mitigate risk addressed and identified.  Does not meet criteria for 72-hour hold, inpatient psychiatric admission, and does not demonstrate imminent risk to self or others as access to guns have been removed.         DIAGNOSIS   Principal Problem:    Major depressive disorder, recurrent episode, moderate " (H)    Active Problem List:  Patient Active Problem List   Diagnosis    Colon polyps    COPD (chronic obstructive pulmonary disease) (H)    Benign essential hypertension    History of smoking    Hypercholesterolemia    Lung nodules    Major depressive disorder, recurrent episode, moderate (H)    Coronary artery disease due to calcified coronary lesion    Anxiety    Hx of carcinoma in situ of prostate    Ketosis (H)    Peripheral edema    Myocardial injury    COPD with acute exacerbation (H)    Fall, initial encounter    Skin tear of elbow without complication, unspecified laterality, initial encounter    Alcohol use disorder    Pneumonia of both lower lobes due to infectious organism    Alcohol use disorder, moderate, dependence (H)          RECOMMENDATIONS   Target psychiatric symptoms and interventions:  Education:  Risks, benefits, and alternatives discussed at length with patient.     Safety/Supervision/Disposition:  Legal: Voluntary.  Does not meet criteria for 72-hour hold.  Placement: Home with mental health referrals when medically cleared.  Does not meet criteria for inpatient psychiatric admission and declines KAIA treatment.  Referrals: Mental health referrals for psychiatry and therapy requested for AVS placement.  Risk Assessment:   At time of consult, determined to not be an imminent danger to self and/or others.  Denies SI and SIB.  Does have notable risk factors to include chronic depression and anxiety complicated by AUD.  However, risk is mitigated by commitment to family and history of seeking help when needed.  Based on available evidence including factors cited, she does not appear to be at imminent risk for self-harm, does not meet criteria for a 72-hour hold/LISA, and remains appropriate to resume outpatient level of care.  Additional steps taken to minimize risk include: medication optimization with monitoring/supervision, close psychiatric follow up, and provision of crisis resources.   Voluntary referral for outpatient psychiatry offered and was accepted.  Precautions placed:  Discontinue 1:1 as patient denies suicide ideation while hospitalized.  Routine as per Unit.  Withdrawal.  Delirium.    Medication Recommendations:   [place] PDMP Reviewed:  Unremarkable  PTA Psychiatric Medications reviewed.  CIWA protocol, Valium triggered, with gabapentin taper.  Gabapentin: On 4/25, schedule gabapentin 300 mg 3 times daily, mood/anxiety/AUD and pain.  Mirtazapine: Start retrial mirtazapine 15 mg nightly, depression/anxiety/sleep, to replace PTA citalopram due to issues of efficacy leading to discontinuation prior to admission.  Melatonin: Schedule melatonin 5 mg nightly.    Trazodone:: As needed trazodone 50 mg nightly, sleep.    Acute Medical Problems and Treatments:  History and Physical (4/19):    Reviewed and discussed with patient.  Consults:    KAIA evaluation pending at time of visit.  CM Intake pending  Labs:  Cr 0.71  Ethyl Alcohol 0.19  Qtc 476  AST/ALT 36/21    Behavioral/Psychological/Social:  In-session supportive therapy.    Care Coordination:  Treatment plan discussed with staff.  Please reconsult with Psychiatry as needed.         Risk Assessment: [facility] RISK ASSESSMENT: Patient able to contract for safety and Patient on precautions    Total encounter time:  A total of  64  minutes spent related to chart review, history and exam, documentation   and further activities as noted above    This note was created with help of Dragon dictation system. Grammatical / typing errors are not intentional.        John Coulter MD - 04/21/2025  - 9:08 AM  Consult/Liaison Psychiatry   [facility]    Please call the [facility] CL line ([phone]) with questions and to determine consult service coverage.

## 2025-04-22 ENCOUNTER — APPOINTMENT (OUTPATIENT)
Dept: OCCUPATIONAL THERAPY | Facility: CLINIC | Age: 80
DRG: 896 | End: 2025-04-22
Attending: INTERNAL MEDICINE
Payer: COMMERCIAL

## 2025-04-22 VITALS
BODY MASS INDEX: 25.1 KG/M2 | WEIGHT: 185.1 LBS | OXYGEN SATURATION: 100 % | HEART RATE: 82 BPM | SYSTOLIC BLOOD PRESSURE: 139 MMHG | RESPIRATION RATE: 18 BRPM | DIASTOLIC BLOOD PRESSURE: 95 MMHG | TEMPERATURE: 97.8 F

## 2025-04-22 LAB
ALBUMIN SERPL BCG-MCNC: 4 G/DL (ref 3.5–5.2)
ALP SERPL-CCNC: 59 U/L (ref 40–150)
ALT SERPL W P-5'-P-CCNC: 31 U/L (ref 0–70)
ANION GAP SERPL CALCULATED.3IONS-SCNC: 12 MMOL/L (ref 7–15)
AST SERPL W P-5'-P-CCNC: 34 U/L (ref 0–45)
BILIRUB SERPL-MCNC: 0.7 MG/DL
BUN SERPL-MCNC: 22.3 MG/DL (ref 8–23)
CALCIUM SERPL-MCNC: 9.1 MG/DL (ref 8.8–10.4)
CHLORIDE SERPL-SCNC: 99 MMOL/L (ref 98–107)
CREAT SERPL-MCNC: 0.95 MG/DL (ref 0.67–1.17)
EGFRCR SERPLBLD CKD-EPI 2021: 82 ML/MIN/1.73M2
ERYTHROCYTE [DISTWIDTH] IN BLOOD BY AUTOMATED COUNT: 13.8 % (ref 10–15)
GLUCOSE BLDC GLUCOMTR-MCNC: 104 MG/DL (ref 70–99)
GLUCOSE BLDC GLUCOMTR-MCNC: 117 MG/DL (ref 70–99)
GLUCOSE BLDC GLUCOMTR-MCNC: 127 MG/DL (ref 70–99)
GLUCOSE SERPL-MCNC: 100 MG/DL (ref 70–99)
HCO3 SERPL-SCNC: 29 MMOL/L (ref 22–29)
HCT VFR BLD AUTO: 45.4 % (ref 40–53)
HGB BLD-MCNC: 15.5 G/DL (ref 13.3–17.7)
MCH RBC QN AUTO: 31.3 PG (ref 26.5–33)
MCHC RBC AUTO-ENTMCNC: 34.1 G/DL (ref 31.5–36.5)
MCV RBC AUTO: 92 FL (ref 78–100)
PLATELET # BLD AUTO: 208 10E3/UL (ref 150–450)
POTASSIUM SERPL-SCNC: 4.1 MMOL/L (ref 3.4–5.3)
PROT SERPL-MCNC: 6.6 G/DL (ref 6.4–8.3)
RBC # BLD AUTO: 4.96 10E6/UL (ref 4.4–5.9)
SODIUM SERPL-SCNC: 140 MMOL/L (ref 135–145)
WBC # BLD AUTO: 7 10E3/UL (ref 4–11)

## 2025-04-22 PROCEDURE — 97530 THERAPEUTIC ACTIVITIES: CPT | Mod: GO | Performed by: OCCUPATIONAL THERAPIST

## 2025-04-22 PROCEDURE — 82310 ASSAY OF CALCIUM: CPT | Performed by: INTERNAL MEDICINE

## 2025-04-22 PROCEDURE — 97165 OT EVAL LOW COMPLEX 30 MIN: CPT | Mod: GO | Performed by: OCCUPATIONAL THERAPIST

## 2025-04-22 PROCEDURE — 85014 HEMATOCRIT: CPT | Performed by: INTERNAL MEDICINE

## 2025-04-22 PROCEDURE — 99239 HOSP IP/OBS DSCHRG MGMT >30: CPT | Performed by: INTERNAL MEDICINE

## 2025-04-22 PROCEDURE — 250N000013 HC RX MED GY IP 250 OP 250 PS 637: Performed by: INTERNAL MEDICINE

## 2025-04-22 PROCEDURE — 36415 COLL VENOUS BLD VENIPUNCTURE: CPT | Performed by: INTERNAL MEDICINE

## 2025-04-22 RX ORDER — MIRTAZAPINE 15 MG/1
15 TABLET, FILM COATED ORAL AT BEDTIME
Qty: 30 TABLET | Refills: 0 | Status: SHIPPED | OUTPATIENT
Start: 2025-04-22

## 2025-04-22 RX ORDER — GABAPENTIN 300 MG/1
300 CAPSULE ORAL 2 TIMES DAILY
Status: DISCONTINUED | OUTPATIENT
Start: 2025-04-22 | End: 2025-04-22 | Stop reason: HOSPADM

## 2025-04-22 RX ORDER — FOLIC ACID 1 MG/1
1 TABLET ORAL DAILY
Status: DISCONTINUED | OUTPATIENT
Start: 2025-04-22 | End: 2025-04-22 | Stop reason: HOSPADM

## 2025-04-22 RX ORDER — HYDROXYZINE HYDROCHLORIDE 50 MG/1
50 TABLET, FILM COATED ORAL
Status: DISCONTINUED | OUTPATIENT
Start: 2025-04-22 | End: 2025-04-22 | Stop reason: HOSPADM

## 2025-04-22 RX ORDER — GABAPENTIN 300 MG/1
300 CAPSULE ORAL 2 TIMES DAILY
Qty: 60 CAPSULE | Refills: 0 | Status: SHIPPED | OUTPATIENT
Start: 2025-04-22

## 2025-04-22 RX ADMIN — GABAPENTIN 900 MG: 300 CAPSULE ORAL at 06:03

## 2025-04-22 RX ADMIN — THIAMINE HCL TAB 100 MG 100 MG: 100 TAB at 09:27

## 2025-04-22 RX ADMIN — DOXYCYCLINE HYCLATE 100 MG: 100 CAPSULE ORAL at 09:27

## 2025-04-22 RX ADMIN — CLONIDINE HYDROCHLORIDE 0.1 MG: 0.1 TABLET ORAL at 01:17

## 2025-04-22 RX ADMIN — ALBUTEROL SULFATE 2 PUFF: 90 AEROSOL, METERED RESPIRATORY (INHALATION) at 10:37

## 2025-04-22 RX ADMIN — Medication 1 TABLET: at 09:27

## 2025-04-22 RX ADMIN — FOLIC ACID 1 MG: 1 TABLET ORAL at 09:27

## 2025-04-22 ASSESSMENT — ACTIVITIES OF DAILY LIVING (ADL)
DEPENDENT_IADLS:: INDEPENDENT
ADLS_ACUITY_SCORE: 33
ADLS_ACUITY_SCORE: 37
ADLS_ACUITY_SCORE: 33
ADLS_ACUITY_SCORE: 33
ADLS_ACUITY_SCORE: 37
ADLS_ACUITY_SCORE: 33
ADLS_ACUITY_SCORE: 33
ADLS_ACUITY_SCORE: 37
ADLS_ACUITY_SCORE: 33

## 2025-04-22 NOTE — CONSULTS
"Met with pt for CD Consult and introduced self and role in pt's care.  Inquired about pt's interest in KAIA Assessment for referrals to Tx or any additional community resources.  Pt reports \"I have connection already\" and has an AA group he has attended.  Pt declined to complete KAIA Assessment for referrals to Tx or interested in any additional community KAIA resources, declined to have information sent to him or added to his AVS.    Pt reports he has a therapist he has been wropking with through [facility], would like to transition to a new provider that has an ex- or police background like himself.  This writer confirmed with pt that he has been refererd to the  [facility] for after current hospital admission, pt verbalized appreciation.    Relayed to pt that if they change their mind while admitted and would like to complete a KAIA Assessment for referrals to treatment or need any additional CD Resources they may alert unit staff who will assist in having CD Consult re-ordered.  If pt has active insurance they may also schedule an assessment on an outpatient basis by calling [facility] at [phone].    MONICA Telles, [facility]  Substance Use Disorder Evaluation Counselor  Email: [email or web address]        "

## 2025-04-22 NOTE — PROGRESS NOTES
Care Management Discharge Note    Discharge Date: 04/22/2025     Discharge Disposition: Home, Home Care    Discharge Services: Home Care    Discharge DME: None    Discharge Transportation: family or friend will provide     Private pay costs discussed: Not applicable    Does the patient's insurance plan have a 3 day qualifying hospital stay waiver?  No    PAS Confirmation Code: N/A    Patient/family educated on Medicare website which has current facility and service quality ratings: yes    Education Provided on the Discharge Plan: Yes    Persons Notified of Discharge Plans: Patient     Patient/Family in Agreement with the Plan: yes    Handoff Referral Completed: No, handoff not indicated or clinically appropriate    Additional Information:  Pt will be discharging home with home health care today.  The referral for PT OT was accepted by [facility]. SW sent them the discharge orders and let pt know they will be reaching out for scheduling.   Pt confirmed he had someone coming to pick him up around 1530.   SW addressed all of pt's questions and concerns related to the discharge plan at this time.     ADELE Ludwig  Inpatient Care Coordination   [facility]  [phone]

## 2025-04-22 NOTE — CONSULTS
"Care Management Initial Consult    General Information  Assessment completed with: Patient, Glen \"Shaq\"  Type of CM/SW Visit: Initial Assessment    Primary Care Provider verified and updated as needed: Yes   Readmission within the last 30 days: no previous admission in last 30 days      Reason for Consult: discharge planning  Advance Care Planning: Advance Care Planning Reviewed: no concerns identified          Communication Assessment  Patient's communication style: spoken language (English or Bilingual)    Hearing Difficulty or Deaf: yes   Wear Glasses or Blind: no    Cognitive  Cognitive/Neuro/Behavioral: WDL  Level of Consciousness: alert  Arousal Level: opens eyes spontaneously  Orientation: oriented x 4  Mood/Behavior: calm, cooperative  Best Language: 0 - No aphasia  Speech: clear, spontaneous, logical    Living Environment:   People in home: alone     Current living Arrangements: condominium      Able to return to prior arrangements: yes       Family/Social Support:  Care provided by: self  Provides care for: no one  Marital Status:   Support system: Children          Description of Support System: Supportive    Support Assessment: Adequate family and caregiver support    Current Resources:   Patient receiving home care services: No        Community Resources: None  Equipment currently used at home: walker, rolling, shower chair  Supplies currently used at home: None    Employment/Financial:  Employment Status: retired        Financial Concerns: none   Referral to Financial Worker: No       Does the patient's insurance plan have a 3 day qualifying hospital stay waiver?  No    Lifestyle & Psychosocial Needs:  Social Drivers of Health     Food Insecurity: Low Risk  (4/20/2025)    Food Insecurity     Within the past 12 months, did you worry that your food would run out before you got money to buy more?: No     Within the past 12 months, did the food you bought just not last and you didn t have money " to get more?: No   Depression: Not at risk (8/8/2024)    [email or web address]    PHQ-2     PHQ-2 TOTAL SCORE: 2   Recent Concern: Depression - At risk (5/29/2024)    [facility] from [email or web address]    PHQ-2     PHQ-2 TOTAL SCORE: 3   Housing Stability: Low Risk  (4/20/2025)    Housing Stability     Do you have housing? : Yes     Are you worried about losing your housing?: No   Tobacco Use: Medium Risk (1/2/2025)    [facility] from [email or web address]    Patient History     Smoking Tobacco Use: Former     Smokeless Tobacco Use: Never     Passive Exposure: Not on file   Financial Resource Strain: Low Risk  (4/20/2025)    Financial Resource Strain     Within the past 12 months, have you or your family members you live with been unable to get utilities (heat, electricity) when it was really needed?: No   Alcohol Use: Not on file   Transportation Needs: Low Risk  (4/20/2025)    Transportation Needs     Within the past 12 months, has lack of transportation kept you from medical appointments, getting your medicines, non-medical meetings or appointments, work, or from getting things that you need?: No   Physical Activity: Not on file   Interpersonal Safety: Low Risk  (4/20/2025)    Interpersonal Safety     Do you feel physically and emotionally safe where you currently live?: Yes     Within the past 12 months, have you been hit, slapped, kicked or otherwise physically hurt by someone?: No     Within the past 12 months, have you been humiliated or emotionally abused in other ways by your partner or ex-partner?: No   Stress: Not on file   Social Connections: Unknown (12/13/2023)    Received from [facility]    Social Connections     Frequency of Communication with Friends and Family: Not on file   Health Literacy: Not on file       Functional Status:  Prior to admission patient needed assistance:    Dependent ADLs:: Ambulation-walker  Dependent IADLs:: Independent  Assesssment of Functional Status: Not at  functional baseline    Mental Health Status:  Mental Health Status: Current Concern  Mental Health Management: Individual Therapy, Medication    Chemical Dependency Status:  Chemical Dependency Status: Current Concern  Chemical Dependency Management: AA          Values/Beliefs:  Spiritual, Cultural Beliefs, Taoist Practices, Values that affect care: no          Values/Beliefs Comment: [place]ack[place]    Discussed  Partnership in Safe Discharge Planning  document with patient/family: No    Additional Information:  SW met with pt for initial Care Management consult. CM/SW was consulted for discharge planning, mental health resources, and chemical health issues. CD saw pt this morning and noted that pt has declined the KAIA assessment and pt declined Tx as well as additional community resources.     SW confirmed that all of the information on his face sheet is accurate and up to date. There was no PCP listed, SW confirmed he sees Christianne Moe at the [facility]. Pt said he is overdue for an annual appointment, SW helped him get one scheduled for May 2nd at 1:30 with a different provider at that clinic so he could be seen sooner. Christianne Moe was booked out till mid June.     Pt comes from a [facility] where he lives alone. He reports independence with ADLs and IADLs at baseline, and has recently been utilizing a 4WW for mobility. HIMA discussed the recommendation for PT OT home care services, and pt was agreeable. Pt does not have a preferred agency and was agreeable to HIMA sending a referral to [facility] for outsourcing.    ADELE Ludwig  Inpatient Care Coordination   [facility]  [phone]

## 2025-04-22 NOTE — PLAN OF CARE
"Goal Outcome Evaluation:      Plan of Care Reviewed With: patient    Overall Patient Progress: improvingOverall Patient Progress: improving    Outcome Evaluation: A&Ox4. SBA with GB/walker. Voiding well, up to bathroom. IV rocephin and PO doxycycline. Pt on 1.5 L O2 via NC overnight. Contact precautions maintained, candida kushal results pending.    Pt denied pain overnight. 0200 sugar was 127. SOB with activity. CIWAs 1 and 1. On tele- reading @ 0600 was SR with inverted T's, HR 80s.     *Tele called @ 0250, pt had 6 beats on V-tach.      Problem: Adult Inpatient Plan of Care  Goal: Plan of Care Review  Description: The Plan of Care Review/Shift note should be completed every shift.  The Outcome Evaluation is a brief statement about your assessment that the patient is improving, declining, or no change.  This information will be displayed automatically on your shiftnote.  Outcome: Progressing  Flowsheets (Taken 4/22/2025 0250)  Outcome Evaluation: A&Ox4. SBA with GB/walker. Voiding well, up to bathroom. IV rocephin and PO doxycycline. Pt on 1.5 L O2 via NC overnight. Contact precautions maintained, candida kushal results pending.  Plan of Care Reviewed With: patient  Overall Patient Progress: improving  Goal: Patient-Specific Goal (Individualized)  Description: You can add care plan individualizations to a care plan. Examples of Individualization might be:  \"Parent requests to be called daily at 9am for status\", \"I have a hard time hearing out of my right ear\", or \"Do not touch me to wake me up as it startlesme\".  Outcome: Progressing  Goal: Absence of Hospital-Acquired Illness or Injury  Outcome: Progressing  Intervention: Identify and Manage Fall Risk  Recent Flowsheet Documentation  Taken 4/22/2025 0117 by Faith Alvarado RN  Safety Promotion/Fall Prevention:   activity supervised   assistive device/personal items within reach   clutter free environment maintained   increased rounding and observation   increase " visualization of patient   lighting adjusted   mobility aid in reach   nonskid shoes/slippers when out of bed   patient and family education   room door open   room near nurse's station   safety round/check completed   room organization consistent   supervised activity  Intervention: Prevent Infection  Recent Flowsheet Documentation  Taken 4/22/2025 0117 by Faith Alvarado, RN  Infection Prevention:   cohorting utilized   equipment surfaces disinfected   hand hygiene promoted   rest/sleep promoted   single patient room provided   personal protective equipment utilized  Goal: Optimal Comfort and Wellbeing  Outcome: Progressing  Goal: Readiness for Transition of Care  Outcome: Progressing     Problem: Delirium  Goal: Optimal Coping  Outcome: Progressing  Goal: Improved Behavioral Control  Outcome: Progressing  Intervention: Minimize Safety Risk  Recent Flowsheet Documentation  Taken 4/22/2025 0117 by Faith Alvarado, RN  Enhanced Safety Measures:   assistive devices when indicated   pain management   patient/family teach back on injury risk   review medications for side effects with activity   room near unit station  Goal: Improved Attention and Thought Clarity  Outcome: Progressing  Goal: Improved Sleep  Outcome: Progressing

## 2025-04-22 NOTE — PROGRESS NOTES
04/22/25 0924   Appointment Info   Signing Clinician's Name / Credentials (OT) Pb Hylton EdD, OTR/L   Rehab Comments (OT) Initial evaluation and treatment   Living Environment   People in Home alone   Current Living Arrangements condominium  (2nd floor, elevator in building.  Pt states his unit is handicapped accessible.  Walk-in shower with seat and grab bars.)   Home Accessibility no concerns   Transportation Anticipated car, drives self   Living Environment Comments Has a 4WW for balance and activity tolerance   Self-Care   Usual Activity Tolerance moderate   Current Activity Tolerance moderate   Equipment Currently Used at Home walker, rolling;shower chair   Fall history within last six months yes   Number of times patient has fallen within last six months 2   Activity/Exercise/Self-Care Comment reports purchasing 2 O2 concentrators.  Concerned about breathing issues working at higher altitudes in [place]   General Information   Onset of Illness/Injury or Date of Surgery 04/20/25   Referring Physician Brendan Bejarano   Patient/Family Therapy Goal Statement (OT) get stronger and breathe better.  Committed to doing his work in [facility].  Also wanting to set up a regular relationship with a therapist for emotional support   Additional Occupational Profile Info/Pertinent History of Current Problem Per H&P, pt is a 79 year old male with  past medical history including PE on Xarelto, COPD, chronic pain, anxiety and depression with history of alcohol abuse and prior withdrawal admitted on 4/19/2025 with alcohol intoxication, suicidal ideation and multiple falls at home.  Pt has had a psych consult while here, CD order as well.  Has undergoing CD treatment remotely, but pt did not provide specifics.   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  States he is out  of the  5-6 months a year   Existing Precautions/Restrictions fall   General Observations and Info Pt sitting in a recliner, willing to participate   Cognitive Status Examination   Orientation Status orientation to person, place and time   Cognitive Status Comments Pt has some general anxiety and was very talkative about his concerns and what he has needed to do to manage his recent health issues   Visual Perception   Visual Impairment/Limitations WFL   Pain Assessment   Patient Currently in Pain No   Range of Motion Comprehensive   General Range of Motion no range of motion deficits identified   Comment, General Range of Motion pt has WFL B UE strengths   Strength Comprehensive (MMT)   General Manual Muscle Testing (MMT) Assessment upper extremity strength deficits identified   Comment, General Manual Muscle Testing (MMT) Assessment pt has some left shoulder weakness with MMT.  4-/5 left shoulder flexion.  Reports falling out of bed a year ago and breaking his left arm.   Coordination   Upper Extremity Coordination Left UE impaired;Right UE impaired   Fine Motor Coordination Some tremors noted in both hands, more so in right hand than left.  Was funcionally able to move hands but may have some subacute tremors with activity   Coordination Comments try 9 hole peg test next session   Bed Mobility   Comment (Bed Mobility) bed already in a chair so not attempted.  SBA with walker per nursing notes.   Transfers   Transfers sit-stand transfer   Sit-Stand Transfer   Sit-Stand [place] (Transfers) supervision;verbal cues;contact guard   Assistive Device (Sit-Stand Transfers) walker, front-wheeled   Sit/Stand Transfer Comments Pt was able to push off the chair to stand.  Using the walker when up.  Fair standing with walker reported by PT.   Balance   Balance Assessment no deficits identified   Balance Comments no obvious LOB but pt appears nervous about possible falls.  Has ordered a 4WW with pneumatic tires to use,  reports it is in a box at home.   Clinical Impression   Criteria for Skilled Therapeutic Interventions Met (OT) Yes, treatment indicated   OT Diagnosis decreased independence in ADLS and IADLs   OT Problem List-Impairments impacting ADL problems related to;activity tolerance impaired;fear & anxiety;strength   Assessment of Occupational Performance 3-5 Performance Deficits   Identified Performance Deficits decreased endurance for daily housekeeping and leisure tasks, functional mobility.  Anxiety notable at times, may affect decision process for ADLS and IADLS   Planned Therapy Interventions (OT) ADL retraining;cognition;strengthening;motor coordination training   Clinical Decision Making Complexity (OT) problem focused assessment/low complexity   OT Total Evaluation Time   OT Eval, Low Complexity Minutes (75241) 15   OT Goals   Therapy Frequency (OT) Daily   OT Predicted Duration/Target Date for Goal Attainment 04/25/25   OT Goals OT Goal 1;OT Goal 2;Cognition;OT Goal 3   OT: Cognitive Patient/caregiver will verbalize understanding of cognitive assessment results/recommendations as needed for safe discharge planning   OT: Goal 1 Pt will complete 15+ minute endurance ADL activity while keeping O2 sats at 90% or higher   OT: Goal 2 Pt will answer 10 home safety questions with 80% or greater accuracy on his first response   OT: Goal 3 Pt will complete 9 hole peg test with B UE results within normal limits for his age.   Interventions   Interventions Quick Adds Self-Care/Home Management;Therapeutic Activity   Therapeutic Activities   Therapeutic Activity Minutes (09821) 15   Symptoms noted during/after treatment shortness of breath;other (see comments)  (pt having some mild anxiety becoming teary and having a broken voice as he talked about his stressors at home.  Reports some feelings of being overwhelmed and isolated at times.  Commented how many people on his phone contact list are already dead.)   Treatment  Detail/Skilled Intervention Pt very talkative about his situation and how he came to the hospital.  [place]iculty with breathing and [place]  Needed medical attention when he was in [facility].  On 1.5 L O2 in room.  Stood and used walker to go to his door and back.  Mild desatting from 94% down to 88% on room air.  Was able to bend over to don and doff socks from chair, mild tremors noted in right hand with activity.  Attempted to answer 2 home safety questions.  Needed an additional cue to identify 911 as an emergency number.  Has brother and sister who have keys to his condo if he were locked out.   OT Discharge Planning   OT Plan OT: Endurance ADLS and check fine motor control with the 9 hole peg test.  Home safety questions.  If this is a problem try the SLUMS or other screen.  Pt has good overall responses to questions but was anxious in his conversation.   OT Discharge Recommendation (DC Rec) home with home care occupational therapy   OT Rationale for DC Rec pt appears to have an accessible unit.  However has been getting equipment at home to help out with falls and breathing.  May be overreacting to his recent medical issues, could use an eval of his unit and his routine to see what the real needs would be.   OT Brief overview of current status Goals of therapy will be to address safe mobility and ADLS and make recommendations for discharge to the next level of care.  Pt and RN will continue to follow all fall risk precautions as documented by RN staff while hospitalized.   OT Total Distance Amb During Session (feet) 15   Total Session Time   Timed Code Treatment Minutes 15   Total Session Time (sum of timed and untimed services) 30

## 2025-04-22 NOTE — PLAN OF CARE
Goal Outcome Evaluation:    zita PO well, up with SBA with gait belt and walker, showered today, voiding in good amts, plans to dc to home when ride arrives

## 2025-04-22 NOTE — PLAN OF CARE
"Goal Outcome Evaluation:      Plan of Care Reviewed With: patient    Overall Patient Progress: improvingOverall Patient Progress: improving    Outcome Evaluation: po/iv abx, O2 needed with activity & sleep, ciwa scores 1    Chonic back pain controlled with scheduled meds, up with 1 assist & walker. Geoff regular diet. Voiding without difficulty. Mood improved. BLE, CISSE- wheezing after walk- MDI PRN. Tele SR. Discharge plan yet to be determined      Problem: Adult Inpatient Plan of Care  Goal: Plan of Care Review  Description: The Plan of Care Review/Shift note should be completed every shift.  The Outcome Evaluation is a brief statement about your assessment that the patient is improving, declining, or no change.  This information will be displayed automatically on your shiftnote.  Outcome: Progressing  Flowsheets (Taken 4/21/2025 2307)  Outcome Evaluation: po/iv abx, O2 needed with activity & sleep, ciwa scores 1  Plan of Care Reviewed With: patient  Overall Patient Progress: improving  Goal: Patient-Specific Goal (Individualized)  Description: You can add care plan individualizations to a care plan. Examples of Individualization might be:  \"Parent requests to be called daily at 9am for status\", \"I have a hard time hearing out of my right ear\", or \"Do not touch me to wake me up as it startlesme\".  Outcome: Progressing  Goal: Absence of Hospital-Acquired Illness or Injury  Outcome: Progressing  Intervention: Identify and Manage Fall Risk  Recent Flowsheet Documentation  Taken 4/21/2025 1738 by Rosie Lai RN  Safety Promotion/Fall Prevention:   activity supervised   assistive device/personal items within reach   clutter free environment maintained   nonskid shoes/slippers when out of bed   patient and family education   safety round/check completed  Intervention: Prevent Skin Injury  Recent Flowsheet Documentation  Taken 4/21/2025 1738 by Rosie Lai RN  Body Position: position changed " independently  Goal: Optimal Comfort and Wellbeing  Outcome: Progressing  Intervention: Monitor Pain and Promote Comfort  Recent Flowsheet Documentation  Taken 4/21/2025 2100 by Rosie Lai, RN  Pain Management Interventions: medication (see MAR)  Taken 4/21/2025 1738 by Rosie Lai, RN  Pain Management Interventions:   medication offered but refused   rest  Goal: Readiness for Transition of Care  Outcome: Progressing     Problem: Delirium  Goal: Optimal Coping  Outcome: Progressing  Goal: Improved Behavioral Control  Outcome: Progressing  Goal: Improved Attention and Thought Clarity  Outcome: Progressing  Goal: Improved Sleep  Outcome: Progressing

## 2025-04-22 NOTE — DISCHARGE SUMMARY
[facility]  Discharge Summary  Name: Omar Adams    MRN: [id number]  YOB: 1945    Age: 79 year old  Date of Discharge:  4/22/2025  Date of Admission: 4/19/2025  Primary Care Provider: Rhina Ref-Primary, Physician  Discharge Physician:  Maxi Vazquez MD  Discharging Service:  Hospitalist      Hospital Course/Discharge Diagnoses:  Omar Adams is a 79 year old male with past medical history including PE on Xarelto, COPD, chronic pain, anxiety and depression with history of alcohol abuse and prior withdrawal admitted on 4/19/2025 with alcohol intoxication, suicidal ideation and multiple falls at home.  Family called EMS and he was brought to the emergency room.  He recently had been calling a suicide hotline as well.    Here alcohol level 0.13.  He did manifest some mild withdrawal initially but this has since resolved.  Psychiatry has been consulted.    As of 4/22 withdrawal seems to be completely resolved.  Psychiatry consulted and the patient now contracts for safety with plan to discharge to the community.  Retrial of Remeron and we will schedule gabapentin 300 mg twice daily for combination mood stabilization and alcohol cravings as he has found this helpful in the past.  He was seen by our chemical dependency counselor as well regarding community resources.    At this point he will discharge home with recommendation for outpatient primary care and mental health follow-up.    Problem List/Assessment and Plan:   Acute alcohol intoxication, history of alcohol abuse and no mild alcohol withdrawal with alcohol related ketosis: As above.  --Schedule gabapentin 300 mg twice daily  --Seen by CD and psychiatry    2.   Anxiety, depression and recent suicidal ideation: He actually called his brother to take away his guns prior to admission when he was feeling suicidal.  he is not regularly taking any antidepressants.  --Contracts for safety, as per psychiatry anticipate referral to  community resources and discharged to the community  --He stopped taking his Celexa sometime ago.  -- Plans to retrial Remeron  -- Schedule gabapentin twice daily.    3.   History of COPD, PE maintained on Xarelto and chronic hypoxemic respiratory failure frequently requiring 2 or 3 L/min at home:   -- Continue supplemental oxygen here  -- Schedule DuoNebs but okay to hold off on steroids  -- Continuing home Symbicort  -- Continue home Xarelto      4.   History of hypertension: Amlodipine    5.   Possible community-acquired pneumonia: Continuing ceftriaxone and azithromycin here,     6.  Hyponatremia: Due to alcohol abuse.    7.  Hyperglycemia upon presentation: Seems to have resolved.       Discharge Disposition:  Discharged to home     Allergies:  Allergies   Allergen Reactions    Shrimp Diarrhea        Discharge Medications:        Review of your medicines        START taking        Dose / Directions   amoxicillin-clavulanate 875-125 MG tablet  Commonly known as: AUGMENTIN      Dose: 1 tablet  Take 1 tablet by mouth 2 times daily for 3 days.  Quantity: 6 tablet  Refills: 0     gabapentin 300 MG capsule  Commonly known as: NEURONTIN  Used for: Anxiety      Dose: 300 mg  Take 1 capsule (300 mg) by mouth 2 times daily.  Quantity: 60 capsule  Refills: 0     mirtazapine 15 MG tablet  Commonly known as: REMERON  Used for: Anxiety      Dose: 15 mg  Take 1 tablet (15 mg) by mouth at bedtime.  Quantity: 30 tablet  Refills: 0            CONTINUE these medicines which have NOT CHANGED        Dose / Directions   albuterol 108 (90 Base) MCG/ACT inhaler  Commonly known as: PROAIR HFA/PROVENTIL HFA/VENTOLIN HFA      Dose: 1-2 puff  Inhale 1-2 puffs into the lungs  Refills: 0     hydrOXYzine HCl 25 MG tablet  Commonly known as: ATARAX      Dose: 50 mg  Take 50 mg by mouth nightly as needed for other (Insomnia).  Refills: 0     pravastatin 10 MG tablet  Commonly known as: PRAVACHOL      Dose: 1 tablet  Take 1 tablet by mouth  daily.  Refills: 0     rivaroxaban ANTICOAGULANT 10 MG Tabs tablet  Commonly known as: XARELTO      Dose: 10 mg  Take 10 mg by mouth daily (with dinner).  Refills: 0     Stiolto Respimat 2.5-2.5 MCG/ACT Aers  Generic drug: tiotropium-olodaterol      Dose: 2 puff  Inhale 2 puffs into the lungs daily.  Refills: 0               Where to get your medicines        These medications were sent to [id number] - [place], [place] - [address] AT [facility] [address], [facility] [address]      Phone: [phone]   amoxicillin-clavulanate 875-125 MG tablet  gabapentin 300 MG capsule  mirtazapine 15 MG tablet         Condition on Discharge:  Discharge condition: Stable       Code status on discharge: Full Code     History of Illness:  See detailed admission note for full details.    Physical Exam:  Vital signs:  Temp: 97.8  F (36.6  C) Temp src: Temporal BP: (!) 139/95 Pulse: 82   Resp: 18 SpO2: 100 % O2 Device: None (Room air) Oxygen Delivery: 1 LPM   Weight: 84 kg (185 lb 1.6 oz)  Estimated body mass index is 25.1 kg/m  as calculated from the following:    Height as of 11/26/24: 1.829 m (6').    Weight as of this encounter: 84 kg (185 lb 1.6 oz).    Wt Readings from Last 1 Encounters:   04/22/25 84 kg (185 lb 1.6 oz)     General: Alert, awake, no acute distress.  HEENT: NC/AT, eyes anicteric, external occular movements intact, face symmetric.  Dentition WNL, MM moist.  Cardiac: RRR, S1, S2.  No murmurs appreciated.  Pulmonary: Normal chest rise, normal work of breathing.  Lungs CTA BL  Abdomen: soft, non-tender, non-distended.  Bowel Sounds Present.  No guarding.  Extremities: no deformities.  Warm, well perfused.  Skin: no rashes or lesions noted.  Warm and Dry.  Neuro: No focal deficits noted.  Speech clear.  Coordination and strength grossly normal.  Psych: Appropriate affect.    Procedures other than Imaging:  none     Imaging:  Results for orders placed or performed during  the hospital encounter of 04/19/25   Head CT w/o contrast    Narrative    EXAM: CT HEAD W/O CONTRAST  LOCATION: [facility]  DATE: 4/19/2025    INDICATION: fall, repsortedly on DOAC  COMPARISON: 11/26/2024.  TECHNIQUE: Routine CT Head without IV contrast. Multiplanar reformats. Dose reduction techniques were used.    FINDINGS:  INTRACRANIAL CONTENTS: No intracranial hemorrhage, extraaxial collection, or mass effect.  No CT evidence of acute infarct. Moderate presumed chronic small vessel ischemic changes. Mild generalized volume loss. No hydrocephalus.     VISUALIZED ORBITS/SINUSES/MASTOIDS: No intraorbital abnormality. No paranasal sinus mucosal disease. No middle ear or mastoid effusion.    BONES/SOFT TISSUES: No acute abnormality.      Impression    IMPRESSION:  1.  No CT evidence for acute intracranial process.  2.  Brain atrophy and presumed chronic microvascular ischemic changes as above.   CT Chest (PE) Abdomen Pelvis w Contrast    Narrative    EXAM: CT CHEST PE ABDOMEN PELVIS W CONTRAST  LOCATION: [facility]  DATE: 4/19/2025    INDICATION: Shortness of breath, hypoxia, BLE edema, h o VTE  COMPARISON: 8/7/2023  TECHNIQUE: CT chest pulmonary angiogram and routine CT abdomen pelvis with IV contrast. Arterial phase through the chest and venous phase through the abdomen and pelvis. Multiplanar reformats and MIP reconstructions were performed. Dose reduction   techniques were used.   CONTRAST: 92mL Isovue 370    FINDINGS:  ANGIOGRAM CHEST: No evidence of acute pulmonary embolism. Mild calcified plaque in the thoracic aorta. No evidence of dissection.     LUNGS AND PLEURA: Emphysema with advanced destructive changes. A few scattered nodules measuring up to 3 mm remain unchanged and can be considered benign. No follow-up needed per Fleischner Society guidelines. There are mild opacities in both lung bases,   right greater than left. These are nonspecific but favor  infection. No consolidation. No pleural effusions.    MEDIASTINUM/AXILLAE: Normal heart size. No pericardial effusions. No adenopathy.    CORONARY ARTERY CALCIFICATION: Severe.    HEPATOBILIARY: Moderate fatty infiltration of the liver. Unchanged 3.7 cm cyst. No follow-up needed.    PANCREAS: Normal.    SPLEEN: Normal.    ADRENAL GLANDS: Normal.    KIDNEYS/BLADDER: Normal.    BOWEL: Normal.    LYMPH NODES: Normal.    VASCULATURE: Diffuse calcified aortoiliac atheromatous plaque. The IVC and iliac veins are patent.    PELVIC ORGANS: Prostatectomy.    MUSCULOSKELETAL: Partially visualized postoperative changes in the proximal left humerus. Bones are demineralized. Compression deformities involving T7 and T8 are unchanged. Additional compression deformities of the lumbar vertebral bodies were not all   included on the prior exam but are likely chronic.      Impression    IMPRESSION:  1.  No evidence of acute pulmonary embolism.  2.  Mild opacities in both lung bases, right greater than left. These are nonspecific but favor infection.  3.  Emphysema with advanced destructive changes.  4.  Moderate fatty infiltration of the liver.  5.  Prostatectomy.     XR Elbow Bilateral G/E 3 Views    Narrative    EXAM: XR ELBOW BILATERAL G/E 3 VIEWS  LOCATION: [facility]  DATE: 4/19/2025    INDICATION: fall, pain, skin tears  COMPARISON: None.      Impression    IMPRESSION: Demineralization without displaced fracture. No left or right elbow joint effusion. Joint spaces are preserved. Small focus of heterotopic ossification adjacent to the right humeral epicondyle.        Consultations:  Consultation during this admission received from psychiatry.       Recent Lab Results:  Recent Labs   Lab 04/22/25  1046 04/20/25  1009 04/19/25  1747   WBC 7.0 4.8 4.3   HGB 15.5 14.6 14.7   HCT 45.4 43.1 43.6   MCV 92 91 92    205 194          Lab Results   Component Value Date     04/20/2025     04/19/2025      11/26/2024     05/24/2021    Lab Results   Component Value Date    CHLORIDE 96 04/20/2025    CHLORIDE 101 04/19/2025    CHLORIDE 100 11/26/2024    CHLORIDE 100 05/24/2021    CHLORIDE 102 11/16/2020    CHLORIDE 101 09/29/2020    CHLORIDE 100 09/28/2020    Lab Results   Component Value Date    BUN 24.4 04/20/2025    BUN 30.0 04/19/2025    BUN 18.9 11/26/2024    BUN 16 05/24/2021    BUN 12 11/16/2020    BUN 17 09/29/2020    BUN 24 09/28/2020      Lab Results   Component Value Date    POTASSIUM 5.2 04/20/2025    POTASSIUM 4.6 04/19/2025    POTASSIUM 5.6 04/19/2025    POTASSIUM 3.3 05/24/2021    POTASSIUM 4.5 11/16/2020    POTASSIUM 3.9 09/29/2020    POTASSIUM 4.3 09/28/2020    Lab Results   Component Value Date    CO2 23 04/20/2025    CO2 26 04/19/2025    CO2 27 11/26/2024    CO2 29 05/24/2021    CO2 26 11/16/2020    CO2 26 09/29/2020    CO2 22 09/28/2020    Lab Results   Component Value Date    CR 0.71 04/20/2025    CR 1.05 04/19/2025    CR 0.93 11/26/2024    CR 0.83 05/24/2021             Pending Results:    Unresulted Labs Ordered in the Past 30 Days of this Admission       Date and Time Order Name Status Description    4/22/2025  8:18 AM Comprehensive metabolic panel In process     4/21/2025 11:08 AM Candida auris by PCR In process              Discharge Instructions and Follow-Up:   Discharge Procedure Orders   Hospital to Primary Care - Establish PCP Referral   Standing Status: Future   Referral Priority: Routine: Next available opening Referral Type: Consultation   Number of Visits Requested: 1     Home Care Referral   Referral Priority: Routine: Next available opening Referral Type: Home Health Therapies & Aides   Number of Visits Requested: 1     Reason for your hospital stay   Order Comments: You were admitted for     Activity   Order Comments: Your activity upon discharge: activity as tolerated     Order Specific Question Answer Comments   Is discharge order? Yes      Follow Up   Order  Comments: Follow up with outpatient mental health services as discussed.     Oxygen Adult/Peds   Order Comments: Oxygen Documentation  I certify that this patient, Omar Adams has been under my care (or a nurse practitioner or physican's assistant working with me). This is the face-to-face encounter for oxygen medical necessity.      At the time of this encounter, I have reviewed the qualifying testing and have determined that supplemental oxygen is reasonable and necessary and is expected to improve the patient's condition in a home setting.         Patient has continued oxygen desaturation due to COPD J44.9.    If portability is ordered, is the patient mobile within the home? yes    Was this visit performed as a telehealth visit: No     Order Specific Question Answer Comments   Medical Equipment (DME) Supplier: [employer]    PATIENT INSTRUCTIONS: If you did not receive this ordered item today, please contact [facility] for availability ([place] Locations: [phone], [place]: [phone]).    Start Date: 2025    Type: Resume    Did the patient have SpO2 (sat) testing (only needed for new oxgyen or liter flow changes)? Yes    Length of Need: Lifetime    Frequency of Use: Continuous    Mode of Delivery - Continuous Nasal Cannula    Liter Flow - Continuous (LPM): 2    Need for Portable Oxygen Equipment: Yes    Evaluate for Conserving Device: Yes    Maintain Sats >= 90%    The face to face evaluation was performed on: 2025    Peak Flow Meter: Yes      Diet   Order Comments: Follow this diet upon discharge: Current Diet:Orders Placed This Encounter      Combination Diet Regular Diet Adult     Order Specific Question Answer Comments   Is discharge order? Yes        Total time spent in face to face contact with the patient and coordinating discharge was:  60 Minutes.

## 2025-04-23 ENCOUNTER — TELEPHONE (OUTPATIENT)
Dept: BEHAVIORAL HEALTH | Facility: CLINIC | Age: 80
End: 2025-04-23
Payer: COMMERCIAL

## 2025-04-23 ENCOUNTER — PATIENT OUTREACH (OUTPATIENT)
Dept: CARE COORDINATION | Facility: CLINIC | Age: 80
End: 2025-04-23
Payer: COMMERCIAL

## 2025-04-23 NOTE — TELEPHONE ENCOUNTER
Writer spoke with pt and scheduled adult mh eval on 05/01/2025 @ 1:30 pm. Benefit message sent.    Heather Small  04/23/2025  [phone]

## 2025-04-23 NOTE — PLAN OF CARE
Physical Therapy Discharge Summary    Reason for therapy discharge:    Discharged to home with home therapy.    Progress towards therapy goal(s). See goals on Care Plan in [facility] electronic health record for goal details.  Goals not met.  Barriers to achieving goals:   discharge from facility.    Therapy recommendation(s):    Continued therapy is recommended.  Rationale/Recommendations:  Anticipate that patient will be able to return home with HHPT to progress activity. Would recommend use of walker for all mobility. Pt mostly limited 2/2 reduced activity tolerance, CISSE at this time.

## 2025-04-23 NOTE — TELEPHONE ENCOUNTER
----- Message from Ramona DAVIS sent at 4/21/2025  1:57 PM CDT -----  Regarding: Referral  Transition Clinic Referral  [place] (Limited)      Please Check Type of Referral Requested:          __X__THERAPY: The Transition clinic is able to schedule patients without current medical insurance; these patient will be referred to our Social Work Care Coordinator for Medical Insurance Assistance. We are open for referral for psychotherapy.             __X__MEDICATION:  Referrals for Medication are ONLY accepted from the following areas (select): Other..... STANDARD PRIORITY                                       Suboxone and Opioid Management Referrals are automatically denied. TC Psychiatry cannot see patient without active medical insurance.        Patient is referred from:  Consult & Liaison :     Reason for Transition Clinic Referral: Therapy & Psychiatry - In Person : Please contact patient post discharge to schedule (currently set as 2-4 days out)           Needs: No         Does Patient Have Access to Technology: Yes        Patient E-mail Address: [email or web address]         Current Patient Phone Number: [phone]         Clinician Gender Preference (if applicable): No

## 2025-04-23 NOTE — TELEPHONE ENCOUNTER
Writer spoke with pt and explained TC services. TC phone number provided. Writer postponed referral to check in tomorrow.    Heather Small  04/23/2025  [phone]

## 2025-04-23 NOTE — TELEPHONE ENCOUNTER
----- Message from Ramona DAVIS sent at 4/21/2025  1:57 PM CDT -----  Regarding: Referral  Transition Clinic Referral  [place] (Limited)      Please Check Type of Referral Requested:          __X__THERAPY: The Transition clinic is able to schedule patients without current medical insurance; these patient will be referred to our Social Work Care Coordinator for Medical Insurance Assistance. We are open for referral for psychotherapy.             __X__MEDICATION:  Referrals for Medication are ONLY accepted from the following areas (select): Other..... STANDARD PRIORITY                                       Suboxone and Opioid Management Referrals are automatically denied. TC Psychiatry cannot see patient without active medical insurance.        Patient is referred from:  Consult & Liaison :     Reason for Transition Clinic Referral: Therapy & Psychiatry - In Person : Please contact patient post discharge to schedule (currently set as 2-4 days out)           Needs: No         Does Patient Have Access to Technology: Yes        Patient E-mail Address: [email or web address]         Current Patient Phone Number: [phone]         Clinician Gender Preference (if applicable): No

## 2025-04-24 LAB — CANDIDA AURIS CONFIRMATION PCR: NORMAL

## 2025-04-30 ENCOUNTER — TELEPHONE (OUTPATIENT)
Dept: BEHAVIORAL HEALTH | Facility: CLINIC | Age: 80
End: 2025-04-30
Payer: COMMERCIAL

## 2025-04-30 NOTE — TELEPHONE ENCOUNTER
Writer called patient to remind them of appointment scheduled for  5/1/2025 . Spoke to patient regarding upcoming Transition Clinic appointment.  Appointment confirmed.  Encouraged patient to complete screeners through [employer].     Manjula Benson  Transition Clinic Coordinator  04/30/25 11:11 AM

## 2025-05-01 ENCOUNTER — OFFICE VISIT (OUTPATIENT)
Dept: BEHAVIORAL HEALTH | Facility: CLINIC | Age: 80
End: 2025-05-01
Payer: COMMERCIAL

## 2025-05-01 DIAGNOSIS — F33.1 MAJOR DEPRESSIVE DISORDER, RECURRENT EPISODE, MODERATE (H): Primary | ICD-10-CM

## 2025-05-01 DIAGNOSIS — F41.1 GENERALIZED ANXIETY DISORDER: ICD-10-CM

## 2025-05-01 ASSESSMENT — ANXIETY QUESTIONNAIRES
5. BEING SO RESTLESS THAT IT IS HARD TO SIT STILL: NOT AT ALL
3. WORRYING TOO MUCH ABOUT DIFFERENT THINGS: SEVERAL DAYS
GAD7 TOTAL SCORE: 7
2. NOT BEING ABLE TO STOP OR CONTROL WORRYING: MORE THAN HALF THE DAYS
6. BECOMING EASILY ANNOYED OR IRRITABLE: MORE THAN HALF THE DAYS
1. FEELING NERVOUS, ANXIOUS, OR ON EDGE: MORE THAN HALF THE DAYS
7. FEELING AFRAID AS IF SOMETHING AWFUL MIGHT HAPPEN: NOT AT ALL
GAD7 TOTAL SCORE: 7

## 2025-05-01 ASSESSMENT — COLUMBIA-SUICIDE SEVERITY RATING SCALE - C-SSRS
6. HAVE YOU EVER DONE ANYTHING, STARTED TO DO ANYTHING, OR PREPARED TO DO ANYTHING TO END YOUR LIFE?: NO
2. HAVE YOU ACTUALLY HAD ANY THOUGHTS OF KILLING YOURSELF?: NO
TOTAL  NUMBER OF ABORTED OR SELF INTERRUPTED ATTEMPTS SINCE LAST CONTACT: NO
SUICIDE, SINCE LAST CONTACT: NO
TOTAL  NUMBER OF INTERRUPTED ATTEMPTS SINCE LAST CONTACT: NO
1. SINCE LAST CONTACT, HAVE YOU WISHED YOU WERE DEAD OR WISHED YOU COULD GO TO SLEEP AND NOT WAKE UP?: NO
ATTEMPT SINCE LAST CONTACT: NO

## 2025-05-01 ASSESSMENT — PATIENT HEALTH QUESTIONNAIRE - PHQ9
SUM OF ALL RESPONSES TO PHQ QUESTIONS 1-9: 9
5. POOR APPETITE OR OVEREATING: NOT AT ALL

## 2025-05-01 NOTE — PROGRESS NOTES
Transition Clinic - Initial Visit Progress Note    Patient  Name: Omar Adams, : 1945    Date:  2025       Service Type:  Mental Health Initial Visit       No data recorded  No data recorded     Session Length:   TC Session Length: 30 (16-37 Minutes)    Visit #: 1    Attendees:  TC Attendees: Client alone    Service Modality:  Service Modality: In-Person    Source of Referral:  Other - [facility] staff/donna Card for bridge until connected to therapy/meds, per chart review.      Informed Consent and Assessment Methods    This provider and patient discussed HIPAA, and the limits of confidentiality; including mandated reporting, the possibility of collaborative discussions with patient's primary care provider and the multidisciplinary team in the MH clinic during consultation.  Discussed the no show policy, and the benefits and possible unintended consequences of therapy.  Patient indicated understanding Transition Clinic services are short term, solution focused, until a referral can be made and patient can bridge to long term therapy.  Patient verbally indicated understanding the informed consent.         Presenting Concerns/  Current Stressors:  Omar Adams is a 79 year old who was referred to the Transition Clinic by [facility] staff/donna Card for bridge until connected to therapy/meds, per chart review.      Initial session:  Introduced transition clinic services as transitional, brief, short-term, solution-focused therapy until connected/transition to next LOC.    Omar Adams reports improving sxs, PHQ9 and GAD7 screeners lower per chart review  Pt reports some improving sxs, reports he has been attending AA support groups and communicating with sponsor.  Pt smiling and laughing in person session today.  Pt reports he does not want programming, pt reports he is just looking for individual therapy and medication management.  Pt declined all programming and [facility]  resources, at this point in time, chart noting indicates so in ED as well.  Pt reports he is looking for a therapist that is male, with trauma experience and in-person.  Appt setup below in plan section of this note.  Pt denies current SI, plan, intent, SIB, HI, AH/VH, and/or britt sxs, at this point in time. CSSR completed.  Pt reports hopeful, looking forward to warmer weather, and [place] warmer [place] areas for winter.  Pt reports medication compliance, adequate supports, and denies current substance use at this point in time, however pt appears to have hx of alcohol use dx.  Pt reports the following protective factors:  willingness to engage in therapy, attached by family/friends/sons, access to variety of clinical interventions, AA support group/sponsor, future-oriented, bright affect, hopeful, identifies reason for living, access to and engagement with healthcare, current engagement in treatment and/or motivation to establish therapeutic relationship, strong bond to family unit, community, job, school, etc, supportive social network or family, fear of death or dying due to pain/suffering, lives in a responsibly safe environment, responsibilities to others, and reality testing ability forward or future oriented thinking; dedication to family or friends; safe and stable environment; regular sleep; purpose; secure attachment; help seeking behaviors when distressed; adherence with prescribed medication; daily obligations; effective problem solving skills; commitment to well being; healthy fear of risky behaviors or pain, caring, committed to sobriety, educated, employed, goal-focused, good listener, has a previous history of therapy, insightful, intelligent, motivated, open to learning, open to suggestions / feedback, support of family, friends and providers, supportive, wants to learn, willing to ask questions, willing to relate to others, and work history .     Pt processed regarding sxs,  alcohol, supports, walking/oxygen, coping, declines program.  Writer provided active listening, empathy and validation. Pt was social talked about giong to a AA celebratory dinner tonight that he was looking forward to.  Pt reports historical coping skills of nature/outside.  Writer and pt explored additional coping skill psychoeducation including mindfulness with focus on present moment, socialization, AA support group/sponsor, meditation, guided imagery, relaxation skills and self-care.  Pt appeared engaged and receptive to the above interventions.      Plan: Pt requested to transition to next LOC individual therapy and medication management appt via CC setup below with pt as follows:    Scheduled Appointment  Date: Thursday, 5/8/2025  Time: 9:00 am - 10:00 am  Provider: Ramez Julian PsyD, LP  Location: [address], [address] [address]  Phone: [phone]  Type: Therapy - (In-Person)    Scheduled Appointment  Date: Tuesday, 5/6/2025  Time: 11:00 am - 12:00 pm  Provider: Bruna Gooden  MS  CNP,PMHNP,RN  Location: [address], [place] [address]  Phone: [phone]  Type: Medication Mgmt - (In-Person)    Transition Clinic follow-up session:  5/15/25 in-person at 12:30pm.    ASSESSMENT:    Required Screenings: The following assessments were completed by patient for this visit:  PHQ9:       10/8/2020     2:58 PM 11/16/2020     8:00 AM 5/1/2025     1:27 PM   PHQ-9 SCORE   PHQ-9 Total Score 0 10 9     GAD7:       5/8/2020     6:48 PM 5/1/2025     1:27 PM   DANICA-7 SCORE   Total Score 4 (minimal anxiety)    Total Score 4 7     CAGE-AID:       5/1/2025     1:27 PM   CAGE-AID Total Score   Total Score 2     PROMIS 10-Global Health (all questions and answers displayed):       5/1/2025     1:27 PM   PROMIS 10   In general, would you say your health is: 1   In general, would you say your quality of life is: 3    In general, how would you rate your physical health? 2   In general, how would you rate your mental health, including your mood and your ability to think? 2   In general, how would you rate your satisfaction with your social activities and relationships? 2   In general, please rate how well you carry out your usual social activities and roles. (This includes activities at home, at work and in your community, and responsibilities as a parent, child, spouse, employee, friend, etc.) 2   To what extent are you able to carry out your everyday physical activities such as walking, climbing stairs, carrying groceries, or moving a chair? 3   In the past 7 days, how often have you been bothered by emotional problems such as feeling anxious, depressed, or irritable? 2   In the past 7 days, how would you rate your fatigue on average? 2   In the past 7 days, how would you rate your pain on average, where 0 means no pain, and 10 means worst imaginable pain? 7   Global Mental Health Score 11   Global Physical Health Score 11   PROMIS TOTAL - SUBSCORES 22     [place] Suicide Severity Rating Scale (Lifetime/Recent)      11/26/2024    11:00 PM 4/19/2025     5:01 PM   [place] Suicide Severity Rating (Lifetime/Recent)   Q1 Wished to be Dead (Past Month) 0-->no 0-->no   Q2 Suicidal Thoughts (Past Month) 0-->no 0-->no   Q6 Suicide Behavior (Lifetime) 0-->no 0-->no   Level of Risk per Screen no risks indicated no risks indicated     [place] Suicide Severity Rating Scale (Short Version)      11/26/2024    11:00 PM 4/19/2025     5:01 PM 5/1/2025     3:00 PM   [place] Suicide Severity Rating (Short Version)   Q1 Wished to be Dead (Past Month) 0-->no 0-->no    Q2 Suicidal Thoughts (Past Month) 0-->no 0-->no    Q6 Suicide Behavior (Lifetime) 0-->no 0-->no    Level of Risk per Screen no risks indicated no risks indicated    1. Wish to be Dead (Since Last Contact)   N   2. Non-Specific Active Suicidal Thoughts (Since Last Contact)   N    Actual Attempt (Since Last Contact)   N   Has subject engaged in non-suicidal self-injurious behavior? (Since Last Contact)   N   Interrupted Attempts (Since Last Contact)   N   Aborted or Self-Interrupted Attempt (Since Last Contact)   N   Preparatory Acts or Behavior (Since Last Contact)   N   Suicide (Since Last Contact)   N   Calculated C-SSRS Risk Score (Since Last Contact)   No Risk Indicated       Mental Status Assessment:  Appearance:   Appropriate  well groomed   Eye Contact:   Good   Psychomotor Behavior: Normal   Attitude:   Cooperative  Friendly Pleasant  Orientation:   All  Speech     Rate / Production:  Normal/ Responsive     Volume:   Normal   Mood:    Normal  Affect:    Appropriate   Thought Content:  Clear   Thought Form:  Coherent   Insight:    Good       Safety Issues and Plan for Safety and Risk Management:     Patient denies current fears or concerns for personal safety.  Patient denies current or recent suicidal ideation or behaviors.  Patient denies current or recent homicidal ideation or behaviors.  Patient denies current or recent self injurious behavior or ideation.  Patient denies other safety concerns.  Recommended that patient call 911 or go to the local ED should there be a change in any of these risk factors     Diagnostic Criteria:  Major Depressive Disorder  CRITERIA (A-C) REPRESENT A MAJOR DEPRESSIVE EPISODE - SELECT THESE CRITERIA  A) Recurrent episode(s) - symptoms have been present during the same 2-week period and represent a change from previous functioning 5 or more symptoms (required for diagnosis)   - Depressed mood. Note: In children and adolescents, can be irritable mood.     - Diminished interest or pleasure in all, or almost all, activities.    - sleep changes/ sleep.    - Fatigue or loss of energy.     DANICA, by hx, per chart review.  Alcohol Use Dx, by hx, per chart review.    DSM5 Diagnoses: (Sustained by DSM5 Criteria Listed Above)  Diagnoses: 296.22 (F32.1)  Major  Depressive Disorder, Single Episode, Moderate _ and With anxious distress  Psychosocial & Contextual Factors: Pt with MICD hx.      Intervention:   Solution Focused Brief Therapy   Brief Psychotherapy - discussed and prioritizing the most efficient treatment., Cognitive Behavioral Therapy (CBT) - Discussed changing thoughts is the path to changing behaviors and feelings., Mindfulness Therapy - Cultivation of present-oriented, non-judgmental attitude. It helps nurtures great awareness, clarity, and acceptance of reality., Motivational Interviewing - helping to find the motivation to make positive behavior changes., Person-Centered Therapy - Actualizes potential and moves towards increased awareness, spontaneity, trust in self and inner directedness., and Problem-Solving Therapy (PST) - Identify specific problems; brainstorming, evaluation, implementing and reviewing solutions.    Collateral Reports Completed:  TC Collateral: [facility] electronic medical records reviewed.    DATA:  Interactive Complexity: No  Crisis: No    PLAN: (Homework, other):  Provider will continue Diagnostic Assessment. Initial Treatment will focus on: Depressed Mood - anxiety .  2.   Pt requested the following referrals:     Plan: Pt requested to transition to next [facility] individual therapy and medication management appt via CC setup below with pt as follows:    Scheduled Appointment  Date: Thursday, 5/8/2025  Time: 9:00 am - 10:00 am  Provider: Ramez Julian PsyD, LP  Location: [address] [address]  Phone: [phone]  Type: Therapy - (In-Person)    Scheduled Appointment  Date: Tuesday, 5/6/2025  Time: 11:00 am - 12:00 pm  Provider: Bruna Gooden MS  CNP,PMANANTP,RN  Location: [address], [place] [address]  Phone: [phone]  Type: Medication Mgmt - (In-Person)    Transition Clinic follow-up session:  5/15/25  in-person at 12:30pm.      3.   Information in this assessment was obtained from the medical record and provided by patient who is a good and fair historian.   4.   Follow up scheduled:  Transition Clinic follow-up session:  5/15/25 in-person at 12:30pm.          Patient was given the following to do until next session:  encourage abstain from alcohol, encourage attend appt setup, encourage mindfulness, meditation ,AA support group/sponsor  5.  Anticipated Discharge: Anticipated Discharge Time: 1 - 3 Months   6. Is this the patient's last discharge: No      Procedure Code:  STANDARD Diagnostic Assessment [CPT 92036]    ARMINDA MARY [facility]    Suicide Risk and Safety Concerns were assessed for. Patient meets the following risk assessment and triage: Patient has no change in safety concerns. Committed to safety and agreed to follow previously developed safety plan.

## 2025-05-15 ENCOUNTER — TELEPHONE (OUTPATIENT)
Dept: BEHAVIORAL HEALTH | Facility: CLINIC | Age: 80
End: 2025-05-15
Payer: COMMERCIAL

## 2025-05-15 NOTE — TELEPHONE ENCOUNTER
Coordinator attempted to contact patient to reschedule because provider is out unexpectedly. Voicemail left for patient stating need to cancel appointment today with TC and requested call back to reschedule. [employer] message sent to patient.    Eunice Scott  Transition Clinic Coordinator  05/15/25 7:18 AM

## 2025-07-15 ENCOUNTER — HOSPITAL ENCOUNTER (OUTPATIENT)
Dept: BEHAVIORAL HEALTH | Facility: CLINIC | Age: 80
Discharge: HOME | End: 2025-07-15
Attending: PSYCHIATRY & NEUROLOGY | Admitting: PSYCHIATRY & NEUROLOGY
Payer: COMMERCIAL

## 2025-07-15 PROCEDURE — 90791 PSYCH DIAGNOSTIC EVALUATION: CPT | Performed by: MARRIAGE & FAMILY THERAPIST

## 2025-07-15 ASSESSMENT — PAIN SCALES - GENERAL: PAINLEVEL_OUTOF10: MODERATE PAIN (6)

## 2025-07-15 ASSESSMENT — ANXIETY QUESTIONNAIRES
7. FEELING AFRAID AS IF SOMETHING AWFUL MIGHT HAPPEN: NOT AT ALL
GAD7 TOTAL SCORE: 7
2. NOT BEING ABLE TO STOP OR CONTROL WORRYING: NOT AT ALL
4. TROUBLE RELAXING: SEVERAL DAYS
1. FEELING NERVOUS, ANXIOUS, OR ON EDGE: NEARLY EVERY DAY
5. BEING SO RESTLESS THAT IT IS HARD TO SIT STILL: NOT AT ALL
6. BECOMING EASILY ANNOYED OR IRRITABLE: MORE THAN HALF THE DAYS
IF YOU CHECKED OFF ANY PROBLEMS ON THIS QUESTIONNAIRE, HOW DIFFICULT HAVE THESE PROBLEMS MADE IT FOR YOU TO DO YOUR WORK, TAKE CARE OF THINGS AT HOME, OR GET ALONG WITH OTHER PEOPLE: SOMEWHAT DIFFICULT
GAD7 TOTAL SCORE: 7
3. WORRYING TOO MUCH ABOUT DIFFERENT THINGS: SEVERAL DAYS

## 2025-07-15 ASSESSMENT — COLUMBIA-SUICIDE SEVERITY RATING SCALE - C-SSRS
1. IN THE PAST MONTH, HAVE YOU WISHED YOU WERE DEAD OR WISHED YOU COULD GO TO SLEEP AND NOT WAKE UP?: NO
1. HAVE YOU WISHED YOU WERE DEAD OR WISHED YOU COULD GO TO SLEEP AND NOT WAKE UP?: YES

## 2025-07-15 ASSESSMENT — PATIENT HEALTH QUESTIONNAIRE - PHQ9: SUM OF ALL RESPONSES TO PHQ QUESTIONS 1-9: 11

## 2025-07-15 NOTE — PROGRESS NOTES
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 history of alcohol abuse. It is recommended by this writer that patient attend Intensive Outpatient Program, 55+. Patient is likely to see improvement in mental health symptoms, confidence in utilize coping skills, and decrease in suicidal ideation if they move forward with a higher level of care. Patient and this writer also discussed local support groups of men over 55+, and patient left with resources for this. Patient plans to let his individual therapy know that he will not have individual therapy while in IOP. Patient had stated his individual therapist planned to pause therapy while patient was in treatment, which patient stated would likely make them decline treatment. Patient is likely to see a continuation or increase in symptoms if they decline the recommended treatment. Strengths that will help patient during their treatment include: committed to sobriety, educated, goal-focused, good listener, has a previous history of therapy, insightful, intelligent, motivated, open to learning, open to suggestions / feedback, responsible parent, support of family, friends and providers, wants to learn, willing to ask " questions, willing to relate to others, and work history.  The following protective factors will be good to keep in mind during patient's course of care: hopeful, identifies reason for living, access to and engagement with healthcare, current engagement in treatment and/or motivation to establish therapeutic relationship, strong bond to family unit, community, job, school, etc, supportive social network or family, lives in a responsibly safe environment, and responsibilities to others.     7. KAIA:    KAIA:  Discussed the general effects of drugs and alcohol on health and well-being. Recommendations:  It is recommended that patient continue to attend AA and abstain from consuming alcohol.     8. Records:   These were reviewed at time of assessment.   Information in this assessment was obtained from the medical record and  provided by patient who is a good historian. Patient will have open access to their mental health medical record.    9.   Interactive Complexity: No    10. Safety Plan:     Tjea Safety Plan      Creation Date: 7/15/25       Step 1: Warning signs:    Warning Signs    Drinking      Step 2: Internal coping strategies - Things I can do to take my mind off my problems without contacting another person:    Strategies    Music- prewar blues      Step 3: People and social settings that provide distraction:    Name Contact Information    AA Friends     Friends     Brother     Sister          Step 4: People whom I can ask for help during a crisis:    Name Contact Information    Brother     Sister       Step 5: Professionals or agencies I can contact during a crisis:    Clinician/Agency Name Phone Emergency Contact    Call or Text 437      Text HOME to [phone]      [facility] Emergency Department Emergency Department Address Emergency Department Phone    [facility]        Suicide Prevention Lifeline Phone: Call or Text 892  Crisis Text Line: Text HOME to 161835     Step 6: Making  the environment safer (plan for lethal means safety):   Patient reported he gave his fire arms to his brother     Optional: What is most important to me and worth living for?:      Teja Safety Plan. Ashley Osborn and Krishan Cherry. Used with permission of the authors.         Provider Name/ Credentials:    Fadumo Gore MA, [facility]  Mental Health and Addiction Clinical Services  [address] [facility] [facility], [address], [place], [place]      July 15, 2025

## 2025-07-15 NOTE — PROGRESS NOTES
LOCUS Worksheet     Name: Omar Adams MRN: [id number]    : 1945      Gender:  male    PMI:  NA   Provider Name: [facility]   Provider NPI:  [id number]    Actual level of Care Provided:  Assessment and Referral    Service(s) receiving or referred to:  Intensive Outpatient Program    Reason for Variance: Trauma      Rating completed by: Fadumo Gore      I. Risk of Harm:   3      Moderate Risk of Harm    II. Functional Status:   2      Mild Impairment    III. Co-Morbidity:   3      Significant Co-Morbidity    IV - A. Recovery Environment - Level of Stress:   3      Moderately Stress Environment    IV - B. Recovery Environment - Level of Support:   3      Limited Support in Environment    V. Treatment and Recovery History:   2      Significant Response to Treatment and Recovery Management    VI. Engagement and Recovery Project:   2      Positive Engagement and Recovery       18 Composite Score    Level of Care Recommendation:   17 to 19       High Intensity Community Based Services

## 2025-07-29 ENCOUNTER — HOSPITAL ENCOUNTER (EMERGENCY)
Facility: CLINIC | Age: 80
Discharge: HOME | End: 2025-07-30
Attending: EMERGENCY MEDICINE
Payer: COMMERCIAL

## 2025-07-29 ENCOUNTER — APPOINTMENT (OUTPATIENT)
Dept: GENERAL RADIOLOGY | Facility: CLINIC | Age: 80
End: 2025-07-29
Attending: EMERGENCY MEDICINE
Payer: COMMERCIAL

## 2025-07-29 ENCOUNTER — APPOINTMENT (OUTPATIENT)
Dept: CT IMAGING | Facility: CLINIC | Age: 80
End: 2025-07-29
Attending: EMERGENCY MEDICINE
Payer: COMMERCIAL

## 2025-07-29 DIAGNOSIS — F33.1 MAJOR DEPRESSIVE DISORDER, RECURRENT EPISODE, MODERATE (H): ICD-10-CM

## 2025-07-29 DIAGNOSIS — S40.812A ABRASION OF LEFT UPPER EXTREMITY, INITIAL ENCOUNTER: ICD-10-CM

## 2025-07-29 DIAGNOSIS — F33.9 EPISODE OF RECURRENT MAJOR DEPRESSIVE DISORDER, UNSPECIFIED DEPRESSION EPISODE SEVERITY: ICD-10-CM

## 2025-07-29 DIAGNOSIS — F41.9 ANXIETY: ICD-10-CM

## 2025-07-29 DIAGNOSIS — M25.512 ACUTE PAIN OF LEFT SHOULDER: ICD-10-CM

## 2025-07-29 DIAGNOSIS — S22.32XA CLOSED FRACTURE OF ONE RIB OF LEFT SIDE, INITIAL ENCOUNTER: ICD-10-CM

## 2025-07-29 DIAGNOSIS — J98.11 ATELECTASIS: ICD-10-CM

## 2025-07-29 DIAGNOSIS — F10.90 ALCOHOL USE DISORDER: Primary | ICD-10-CM

## 2025-07-29 LAB
ALBUMIN SERPL BCG-MCNC: 4.4 G/DL (ref 3.5–5.2)
ALP SERPL-CCNC: 82 U/L (ref 40–150)
ALT SERPL W P-5'-P-CCNC: 23 U/L (ref 0–70)
ANION GAP SERPL CALCULATED.3IONS-SCNC: 13 MMOL/L (ref 7–15)
AST SERPL W P-5'-P-CCNC: 32 U/L (ref 0–45)
BASOPHILS # BLD AUTO: 0 10E3/UL (ref 0–0.2)
BASOPHILS NFR BLD AUTO: 0 %
BILIRUB SERPL-MCNC: 0.9 MG/DL
BUN SERPL-MCNC: 24.2 MG/DL (ref 8–23)
CALCIUM SERPL-MCNC: 9.2 MG/DL (ref 8.8–10.4)
CHLORIDE SERPL-SCNC: 91 MMOL/L (ref 98–107)
CREAT SERPL-MCNC: 0.96 MG/DL (ref 0.67–1.17)
EGFRCR SERPLBLD CKD-EPI 2021: 80 ML/MIN/1.73M2
EOSINOPHIL # BLD AUTO: 0 10E3/UL (ref 0–0.7)
EOSINOPHIL NFR BLD AUTO: 0 %
ERYTHROCYTE [DISTWIDTH] IN BLOOD BY AUTOMATED COUNT: 13 % (ref 10–15)
ETHANOL SERPL-MCNC: <0.01 G/DL
GLUCOSE SERPL-MCNC: 116 MG/DL (ref 70–99)
HCO3 SERPL-SCNC: 29 MMOL/L (ref 22–29)
HCT VFR BLD AUTO: 41.4 % (ref 40–53)
HGB BLD-MCNC: 14 G/DL (ref 13.3–17.7)
IMM GRANULOCYTES # BLD: 0 10E3/UL
IMM GRANULOCYTES NFR BLD: 1 %
LYMPHOCYTES # BLD AUTO: 1 10E3/UL (ref 0.8–5.3)
LYMPHOCYTES NFR BLD AUTO: 12 %
MAGNESIUM SERPL-MCNC: 1.9 MG/DL (ref 1.7–2.3)
MCH RBC QN AUTO: 30 PG (ref 26.5–33)
MCHC RBC AUTO-ENTMCNC: 33.8 G/DL (ref 31.5–36.5)
MCV RBC AUTO: 89 FL (ref 78–100)
MONOCYTES # BLD AUTO: 0.8 10E3/UL (ref 0–1.3)
MONOCYTES NFR BLD AUTO: 10 %
NEUTROPHILS # BLD AUTO: 6.2 10E3/UL (ref 1.6–8.3)
NEUTROPHILS NFR BLD AUTO: 77 %
NRBC # BLD AUTO: 0 10E3/UL
NRBC BLD AUTO-RTO: 0 /100
PLATELET # BLD AUTO: 256 10E3/UL (ref 150–450)
POTASSIUM SERPL-SCNC: 3.8 MMOL/L (ref 3.4–5.3)
PROT SERPL-MCNC: 7.4 G/DL (ref 6.4–8.3)
RBC # BLD AUTO: 4.66 10E6/UL (ref 4.4–5.9)
SODIUM SERPL-SCNC: 133 MMOL/L (ref 135–145)
WBC # BLD AUTO: 8 10E3/UL (ref 4–11)

## 2025-07-29 PROCEDURE — 82077 ASSAY SPEC XCP UR&BREATH IA: CPT | Performed by: EMERGENCY MEDICINE

## 2025-07-29 PROCEDURE — 70450 CT HEAD/BRAIN W/O DYE: CPT

## 2025-07-29 PROCEDURE — 250N000013 HC RX MED GY IP 250 OP 250 PS 637: Performed by: EMERGENCY MEDICINE

## 2025-07-29 PROCEDURE — 99285 EMERGENCY DEPT VISIT HI MDM: CPT | Mod: 25 | Performed by: EMERGENCY MEDICINE

## 2025-07-29 PROCEDURE — 71101 X-RAY EXAM UNILAT RIBS/CHEST: CPT | Mod: LT

## 2025-07-29 PROCEDURE — 84155 ASSAY OF PROTEIN SERUM: CPT | Performed by: EMERGENCY MEDICINE

## 2025-07-29 PROCEDURE — 83735 ASSAY OF MAGNESIUM: CPT | Performed by: EMERGENCY MEDICINE

## 2025-07-29 PROCEDURE — 36415 COLL VENOUS BLD VENIPUNCTURE: CPT | Performed by: EMERGENCY MEDICINE

## 2025-07-29 PROCEDURE — 93005 ELECTROCARDIOGRAM TRACING: CPT

## 2025-07-29 PROCEDURE — 73030 X-RAY EXAM OF SHOULDER: CPT | Mod: LT

## 2025-07-29 PROCEDURE — 85025 COMPLETE CBC W/AUTO DIFF WBC: CPT | Performed by: EMERGENCY MEDICINE

## 2025-07-29 RX ORDER — LIDOCAINE 4 G/G
1 PATCH TOPICAL ONCE
Status: DISCONTINUED | OUTPATIENT
Start: 2025-07-29 | End: 2025-07-30 | Stop reason: HOSPADM

## 2025-07-29 RX ORDER — OLANZAPINE 5 MG/1
5 TABLET, ORALLY DISINTEGRATING ORAL 3 TIMES DAILY PRN
Status: DISCONTINUED | OUTPATIENT
Start: 2025-07-29 | End: 2025-07-30 | Stop reason: HOSPADM

## 2025-07-29 RX ORDER — LIDOCAINE 4 G/G
1 PATCH TOPICAL
Status: DISCONTINUED | OUTPATIENT
Start: 2025-07-30 | End: 2025-07-30 | Stop reason: HOSPADM

## 2025-07-29 RX ORDER — HYDROXYZINE HYDROCHLORIDE 25 MG/1
25 TABLET, FILM COATED ORAL EVERY 4 HOURS PRN
Status: DISCONTINUED | OUTPATIENT
Start: 2025-07-29 | End: 2025-07-30 | Stop reason: HOSPADM

## 2025-07-29 RX ADMIN — LIDOCAINE 1 PATCH: 4 PATCH TOPICAL at 22:33

## 2025-07-29 ASSESSMENT — ACTIVITIES OF DAILY LIVING (ADL)
ADLS_ACUITY_SCORE: 55

## 2025-07-29 ASSESSMENT — COLUMBIA-SUICIDE SEVERITY RATING SCALE - C-SSRS
6. HAVE YOU EVER DONE ANYTHING, STARTED TO DO ANYTHING, OR PREPARED TO DO ANYTHING TO END YOUR LIFE?: YES
2. HAVE YOU ACTUALLY HAD ANY THOUGHTS OF KILLING YOURSELF IN THE PAST MONTH?: YES
1. IN THE PAST MONTH, HAVE YOU WISHED YOU WERE DEAD OR WISHED YOU COULD GO TO SLEEP AND NOT WAKE UP?: YES

## 2025-07-29 NOTE — ED PROVIDER NOTES
"Emergency Department Note      Consent was obtained from the patient to use an AI documentation tool in the creation of this note.    Code Status: Prior    History of Present Illness     Chief Complaint:  Alcohol Problem and Fall       HPI   Omar Adams is a 80 year old male with a history of PE on Xarelto, COPD, chronic pain, anxiety and depression with history of alcohol abuse who presents to the emergency department for evaluation of alcohol consumption. The patient reports a three-day episode of heavy alcohol consumption, described as an \"alcoholic induced garcia,\" during which he consumed between a pint and a fifth of alcohol daily. On Saturday, July 26, 2025, experienced a fall resulting in bruising around the left eye, injury to the left upper back just below the shoulder blade, and abrasions with scabbing on the left arm. Also reported a history of prior surgery on the left arm. Noted swelling of both feet for the past three days, coinciding with the period of increased alcohol intake. Denied pain in the left arm at present, describing the injury as superficial. Denied pain in the lower back, jaw, or legs. Denied double vision or pain with eye movement, except for localized pain at the left eye. Denied current nausea or vomiting. Denied chronic issues with swelling of the feet. Not currently taking any diuretics. No known family history of clotting disorders. Reported a history of pneumonia in the previous year, requiring hospitalization in [facility] where he was already staying. Has a diagnosis of COPD, requiring home oxygen at night at a low rate (2 liters), but generally does not require oxygen during the day.    Independent Historian:    None    Review of External Notes  None    Past Medical History   Medical History, Surgical History, Problem List, and Medications  Reviewed in Epic    Physical Exam   Patient Vitals for the past 24 hrs:   BP Temp Temp src Pulse Resp SpO2   07/29/25 2002 -- -- -- -- " -- 96 %   07/29/25 1957 -- -- -- -- -- 96 %   07/29/25 1952 -- -- -- -- -- 96 %   07/29/25 1947 122/76 -- -- 101 -- 96 %   07/29/25 1848 -- -- -- 108 16 --   07/29/25 1808 (!) 131/96 97.5  F (36.4  C) Temporal 110 18 98 %       Physical Exam  Constitutional: Vital signs reviewed as above.   Eyes: PEERL, EOMI B/L  HENT: no pain when pushing on the skull, no diplopia, no pain in the jaw.  Neck: No JVD noted. FROM   Cardiovascular: tachycardic rate, Regular rhythm and normal heart sounds.  No murmur heard. Equal B/L peripheral pulses.  Pulmonary/Chest: Effort normal and breath sounds normal. No respiratory distress. Patient has no wheezes. Patient has no rales.   Gastrointestinal: Soft. There is no tenderness.   Musculoskeletal/Extremities: No midline tenderness in the cervical, thoracic, or lumbar spine. Tenderness in the left shoulder region. Tenderness in the left posterior rib/chest wall. No crepitus noted.  Skin: Abrasions on the left upper extremity. Minor abrasion around the left supraorbital rim.Periorbital ecchymosis on the left eye  Psychiatric: The patient appears calm.     Diagnostics       Laboratory: Imaging:   Labs Ordered and Resulted from Time of ED Arrival to Time of ED Departure   COMPREHENSIVE METABOLIC PANEL (LIMITED OCCURRENCES) - Abnormal       Result Value    Sodium 133 (*)     Potassium 3.8      Carbon Dioxide (CO2) 29      Anion Gap 13      Urea Nitrogen 24.2 (*)     Creatinine 0.96      GFR Estimate 80      Calcium 9.2      Chloride 91 (*)     Glucose 116 (*)     Alkaline Phosphatase 82      AST 32      ALT 23      Protein Total 7.4      Albumin 4.4      Bilirubin Total 0.9     MAGNESIUM (LIMITED OCCURRENCES) - Normal    Magnesium 1.9     ETHANOL LEVEL BLOOD - Normal    Ethanol Level Blood <0.01     CBC WITH PLATELETS AND DIFFERENTIAL    WBC Count 8.0      RBC Count 4.66      Hemoglobin 14.0      Hematocrit 41.4      MCV 89      MCH 30.0      MCHC 33.8      RDW 13.0      Platelet Count 256       % Neutrophils 77      % Lymphocytes 12      % Monocytes 10      % Eosinophils 0      % Basophils 0      % Immature Granulocytes 1      NRBCs per 100 WBC 0      Absolute Neutrophils 6.2      Absolute Lymphocytes 1.0      Absolute Monocytes 0.8      Absolute Eosinophils 0.0      Absolute Basophils 0.0      Absolute Immature Granulocytes 0.0      Absolute NRBCs 0.0       Ribs XR, unilat 3 views + PA chest,  left   Final Result   IMPRESSION:       Diffuse osseous demineralization. There is some cortical step-off to the posterior left fourth rib which was not definitively present on the prior CT from 4/19/2025 and is suspicious for an acute, mildly displaced fracture. There are additional multiple    old, healed left rib fractures.        Heart size is normal. Postoperative changes from plate and screw fixation of the proximal left humerus. No pneumothorax. Some patchy airspace opacity in the lung bases most likely reflects subsegmental atelectasis. Calcific thoracic aortic    atherosclerosis.      XR Shoulder Left 2 Views   Final Result   IMPRESSION:       Diffuse osseous demineralization. The AP view is nonstandard and limited by the degree of patient internal rotation. No dislocation. There are postoperative changes from plate and screw fixation of a proximal left humerus fracture which demonstrates bony    bridging with some of the fracture lucency remaining visible. No acute, displaced left shoulder fracture. There are degenerative changes at the acromioclavicular and glenohumeral joints. The glenohumeral joint space is not well profiled on these views.      CT Head w/o Contrast   Final Result   IMPRESSION:   1.  No CT evidence for acute intracranial process.   2.  Minimal left periorbital soft tissue swelling.   3.  Brain atrophy and presumed chronic microvascular ischemic changes as above.              ECG  ECG taken at 17:49 7/29/49, ECG read at 18:47 7/29/25     There is no available prior ECG for  comparison.  Rate 106 bpm. WA interval 152 ms. QRS duration 112 ms. QT/QTc 384/510 ms. P-R-T axes 65 95 -29.       Independent Interpretation  See ED course    ED Course    Medications Administered  Medications   Lidocaine (LIDOCARE) 4 % Patch 1 patch (has no administration in time range)   Lidocaine (LIDOCARE) 4 % Patch 1 patch (1 patch Transdermal $Patch/Med Applied 7/29/25 2232)       Procedures  Procedures     Discussion of Management  See ED Course    ED Course  ED Course as of 07/29/25 2251 Tue Jul 29, 2025   1839 I obtained the history and evaluated the patient    2207 Rechecked and updated.       Optional/Additional Documentation: None    Medical Decision Making / Diagnosis     MIPS     None    Medical Decision Making:  This 80-year-old presents after a fall and due to concern about alcohol related issues.  Please see the HPI and exam for specifics.  The above workup was undertaken.  Differential certainly included intracranial hemorrhage given the patient's anticoagulant use, electrolyte dysfunction, renal dysfunction, fracture, etc.    The patient's sodium level was slightly low.  Magnesium was reassuring.  Alcohol level is negative.    Imaging was fortunately negative for intracranial hemorrhage or skull fracture.  Rib x-ray does suggest left fourth rib fracture.  Shoulder x-ray did not reveal any acute fracture.    The patient also stated that he was drinking might have been in relation to his mental health.  He would ideally like to be at an alcohol treatment facility but also is interested in speaking with mental health here.    The patient was signed out to Dr. Nina for disposition pending DEC evaluation.      Critical Care:  None.    Disposition:  See ED Course and [facility]    ICD-10 Codes:    ICD-10-CM    1. Alcohol use disorder  F10.90       2. Closed fracture of one rib of left side, initial encounter  S22.32XA     4th rib      3. Atelectasis  J98.11       4. Abrasion of left upper extremity,  initial encounter  S40.812A       5. Acute pain of left shoulder  M25.512            Discharge Medications:  New Prescriptions    No medications on file        7/29/2025   Vamsi Gee DO     [employer]                    Vamsi Gee DO  07/29/25 4978

## 2025-07-29 NOTE — ED TRIAGE NOTES
"Pt presents with concern for \"3 day garcia\" ending on fri, approx 1pint hard alcohol a day. When asked SI screening pt reports etoh use as an attempt on his life. Reported wanting to go to detox. Bruising on L eye from a fall on Saturday, pt on thinners. Also reports back pain and swollen feet. On 3L NC, reports copd hx. Reports hallucinations yx, denies today. Denies seizure hx. A & Ox4     Triage Assessment (Adult)       Row Name 07/29/25 2770          Triage Assessment    Airway WDL WDL        Respiratory WDL    Respiratory WDL WDL;X  baseline O2 use        Cardiac WDL    Cardiac WDL X;rhythm     Pulse Rate & Regularity tachycardic           "

## 2025-07-30 ENCOUNTER — PATIENT OUTREACH (OUTPATIENT)
Dept: CARE COORDINATION | Facility: CLINIC | Age: 80
End: 2025-07-30
Payer: COMMERCIAL

## 2025-07-30 ENCOUNTER — VIRTUAL VISIT (OUTPATIENT)
Dept: PSYCHOLOGY | Facility: CLINIC | Age: 80
End: 2025-07-30
Payer: COMMERCIAL

## 2025-07-30 VITALS
DIASTOLIC BLOOD PRESSURE: 76 MMHG | HEART RATE: 114 BPM | SYSTOLIC BLOOD PRESSURE: 123 MMHG | TEMPERATURE: 98.4 F | OXYGEN SATURATION: 96 % | RESPIRATION RATE: 18 BRPM

## 2025-07-30 DIAGNOSIS — Z78.9 KNOWN HEALTH PROBLEMS: NONE: Primary | ICD-10-CM

## 2025-07-30 PROBLEM — F43.10 PTSD (POST-TRAUMATIC STRESS DISORDER): Status: ACTIVE | Noted: 2025-07-30

## 2025-07-30 PROBLEM — F33.9 RECURRENT MAJOR DEPRESSION: Status: ACTIVE | Noted: 2025-07-30

## 2025-07-30 LAB
ATRIAL RATE - MUSE: 106 BPM
DIASTOLIC BLOOD PRESSURE - MUSE: NORMAL MMHG
INTERPRETATION ECG - MUSE: NORMAL
P AXIS - MUSE: 65 DEGREES
PR INTERVAL - MUSE: 152 MS
QRS DURATION - MUSE: 112 MS
QT - MUSE: 384 MS
QTC - MUSE: 510 MS
R AXIS - MUSE: 95 DEGREES
SYSTOLIC BLOOD PRESSURE - MUSE: NORMAL MMHG
T AXIS - MUSE: -29 DEGREES
VENTRICULAR RATE- MUSE: 106 BPM

## 2025-07-30 PROCEDURE — 250N000013 HC RX MED GY IP 250 OP 250 PS 637: Performed by: EMERGENCY MEDICINE

## 2025-07-30 RX ORDER — ACETAMINOPHEN 325 MG/1
975 TABLET ORAL ONCE
Status: COMPLETED | OUTPATIENT
Start: 2025-07-30 | End: 2025-07-30

## 2025-07-30 RX ADMIN — ACETAMINOPHEN 975 MG: 325 TABLET ORAL at 08:49

## 2025-07-30 ASSESSMENT — COLUMBIA-SUICIDE SEVERITY RATING SCALE - C-SSRS
1. SINCE LAST CONTACT, HAVE YOU WISHED YOU WERE DEAD OR WISHED YOU COULD GO TO SLEEP AND NOT WAKE UP?: NO
6. HAVE YOU EVER DONE ANYTHING, STARTED TO DO ANYTHING, OR PREPARED TO DO ANYTHING TO END YOUR LIFE?: NO
ATTEMPT SINCE LAST CONTACT: NO
SUICIDE, SINCE LAST CONTACT: NO
2. HAVE YOU ACTUALLY HAD ANY THOUGHTS OF KILLING YOURSELF?: NO
TOTAL  NUMBER OF ABORTED OR SELF INTERRUPTED ATTEMPTS SINCE LAST CONTACT: NO
TOTAL  NUMBER OF INTERRUPTED ATTEMPTS SINCE LAST CONTACT: NO

## 2025-07-30 ASSESSMENT — ACTIVITIES OF DAILY LIVING (ADL)
ADLS_ACUITY_SCORE: 55

## 2025-07-30 NOTE — PROGRESS NOTES
Navigator Phone Call     PATIENT'S NAME: Omar Adams  MRN:                      [id number]  :           1945    DATE OF SERVICE: 25    Was the patient reached? Yes.  Refer to notes below.    Does that patient have MyChart? Yes  If no, was assistance provided in setting up MyChart? N/A  If no, reason why: N/A    Was the insurance grid consulted? Yes  Mixed insurance, in network, out of network: In network    Appointment scheduled with [facility]: N/A  Date of appointment: N/A    If still interested in programmatic care was a crisis therapy [facility] appointment offered for bridging until programmatic care starts? N/A  If no, Why? N/A  If yes, date and time of appointment: N/A      Additional Notes:     Per [facility] review:    Appropriate for programming; pt had DA with Fadumo on 7/15/2025 and was referred to programmatic care. Notes indicate that Alyssa GINGERAniyah reached out twice with no call back from pt. If pt is interested in programming, we can schedule them to start.      First attempt date:   Contact made? Yes    I called and patient answered. He said he was walking up to his apartment and cannot talk.  He has my direct call back phone# and will call me back when available.
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
## 2025-07-30 NOTE — ED NOTES
Bed: ED18  Expected date:   Expected time:   Means of arrival:   Comments:  Problem w/ Wall O2, maintenance aware

## 2025-07-30 NOTE — ED PROVIDER NOTES
Patient was changed over from overnight ED physician.  Plan was for patient to be reevaluated by DEC due to alcohol use and passive suicidal comments.  Patient no longer feeling suicidal.  Evaluated by DEC in which he continues to deny suicidal ideation.  He is not intoxicated at this time.  He has outpatient follow-up scheduled and actually has an appoint with his therapist today at 11 AM.  Patient comfortable discharge home and will get a ride from his sister.     Juan Banks MD  07/30/25 1048

## 2025-07-30 NOTE — DISCHARGE INSTRUCTIONS
__________________________________________________________________________________________________________________________________________________________________________________    Patient Navigation Hub - Scheduled Appointment  You have been scheduled a telephone appointment with the Mental Health and Addiction Services Patient Navigation Hub. As a reminder, this is not an in-person appointment. A Navigator will contact you at your personal telephone number on 7-30-25 at 2:00PM. You can expect a 15-30 minute appointment. You will discuss programming options and be assisted in next steps. If you have any further questions or concerns, please contact the Patient Navigation Hub at [phone]. Note: You will not be charged for this telephone appointment.    Our Navigators work to be your point-of-contact for trustworthy and compassionate care from Emergency Services to [facility] Programmatic Care. We will provide resources and communication to help guide you into programmatic care, other internal resources (I.e., Transition Clinic), and/or community programs. Ultimately, our goal is to be the one-stop-shop of communication, coordination, and support for you.    Phone: [phone]  Email: [email or web address]  Fax: [phone]    [facility]  The following is a list of our programming options that may fit your next steps:    Programs  Mental Health  Intensive Outpatient Program (IOP)  Partial Hospitalization Program (PHP)  Day Treatment  Substance Use Disorder  Intensive Outpatient Program  Outpatient Group  Mental Health & Substance Use Disorder  Co-Occurring Intensive Outpatient Program  Residential  Available Locations  [facility], [facility], [place]  Note: Specific program options vary by location.    Transition Clinic  After leaving Emergency Services, it may take up to 30 days to enter a program.  Knowing support is important during this period of time, we offer an urgent model of mental health care via the Transition Clinic. We encourage you to discuss this with your Navigator during your telephone appointment.    FAQ  What can I expect after leaving Emergency Services?  If not already, you will soon be contacted by a Navigator who will work with you to find a program that fits your needs. If you desire to enter one of our [facility] programs, you will enter our programmatic care intake where an assessment will likely be needed over telephone, virtually, or in-person. After this assessment, a Navigator will connect with you again to provide a handoff to the specific program for next steps.   Please note that it may take up to 30 days for you to begin a program. If an extended wait occurs, please consider services at the Transition Clinic.  What is programmatic care?  It is a highly structured and comprehensive approach designed to address mental health and substance use disorders.   Programmatic care is typically used when individuals have not responded well to less intensive treatments or when they require a higher level of intervention due to the severity of their condition. It can be found in various settings, such as an outpatient location, residential treatment facilities, or inpatient hospitals.  Each program varies in duration and weekly commitments. Ask a Navigator for more program details.      __________________________________________________________________________________________________________________________________________________________________________________     Substance Use Disorder Direct Access Resources    Please contact the [employer] hotline ([phone]) as needed; phones are answered 24 hours a day, 7 days a week.    To access substance use treatment you must have a comprehensive assessment completed to begin any treatment program.     If uninsured, please contact  your county of residence for eligibility screen to substance use disorder evaluation and treatment:    [place] - [phone]   [place] - [phone]   [facility] [phone]   [place] - [phone]   [facility] [phone]   [place] - [phone]   [place] - [phone]   [place] - [phone]     If you have private insurance, call the customer service number on the back of your insurance card to find an in-network substance abuse use disorder assessment. The ideal provider will be a treatment facility, licensed in the [facility].     Community KAIA Evaluations: Clients may call their county for a full list of providers - Availability and services listed belo are subject to change, please call the provider to confirm.    [facility], [address] [place] [address], First Floor, [address], [place] [address] (next to the outpatient lab)    Phone: [phone]   Provides bridging services to people with Opiate Use Disorders (OUD) seeking care. This is a front door to Medication Assisted Treatments (MAT), ages 16+  Walk In hours: Monday-Friday 9:00am-3:00pm    [facility]  [phone]  Walk in Assessments: Mon-Friday 7a-1:45p  [address], [place], [address]    [facility]  Central Access [phone]  [address] [place], [place], [address]  *by appointment only    [facility]  [phone] (phone consultation available )  Locations in: [place], [place], [facility], and [place], [place]  Mongolian virtual IOP [phone] or visit [email or web address]   Also offers LGBTQ programming     [facility]  [phone]  [address] [place] [address], [address]  [place], [place], [address]  *Currently only offered via telehealth - call to set up an appointment    [facility]  [address] [place], [place], [address]  Co-Occuring Recovery Program  For more information to to make a referral  call:  [phone]  Walk-in on Fridays  9-11 a.m.    [facility]  [phone]  [address] [place], [place], [address]  *available by appointments only    [facility]  [phone]  [address] [place], [place], [address]  *available by appointment only    [facility]  [phone]  [address] [place], [place], [address]  *walk in assessments available M-F starting at 7 am.    [facility]  [phone]  Locations: [facility], [facility], and [place]  *Walk in assessments availble M-F starting at 8 am -virtual only    [facility]  [phone]  [address] [place] [address]    [facility]  Virtual + Locations: [place], [place], [place], [place], [facility], [facility], [place], [place]   [phone]  *available by appointment only    [facility]  [phone]  [address]  [place], [place], [address]    [facility]  [phone]  [address] [place], [place], [address]  *available by appointment    [facility]  [phone]  [address] [place], [place], [address]  *walk ins available M-TH from 9-3    [facility]note 2]  MAT program: [phone]  [address] [place], [place], [address]    [place]  [phone]  Same day substance use disorder assessments are available Monday - Friday, via walk-in or by appointment at the [place] location.  [address], [place] [address]     [facility] - adolescent and adult SUDs services  [phone]  Offer services Monday through Friday, as well as evening hours Monday through Thursday. Normally, a first appointment will be scheduled within one week  [email or web address]  Locations all over [place]    If you are intoxicated, you may be required to detox at a detox facility before starting treatment. The following are  detox facilities that you can self present to. All detox facilities are able to help you complete an assessment prior to discharge if you choose:    [facility]: Arrive at a [facility] for immediate medical evaluation    [facility]: [address], [place], [address].         [phone]    [facility]: [address] [place], [place], [address]  [phone]     [facility] ([facility]): [address], [place], [address] [phone]     [facility]: [address], [place], [address], [phone]    Ways to help cope with sobriety:    -- Take prescribed medicines as scheduled  -- Keep follow-up appointments  -- Talk to others about your concerns  -- Get regular exercise  -- Practice deep breathing skills  -- Eat a healthy diet  -- Use community resources, including hotline numbers, [facility] crisis and support meetings  -- Stay sober and avoid places/people/things associated with substance use  --Maintain a daily schedule/routine  --Get at least 7-8 hours of sleep per night  --Create a list 10--20 healthy activities that you can do that are enjoyable and do not involve substance use  --Create daily goals (approx. 1-4 goals) per day and work to achieve them throughout the day.       Free Resources:    [place] Recovery [facility] ([facility])  [facility] connects people seeking recovery to resources that help foster and sustain long-term recovery. Whether you are seeking resources for treatment, transportation, housing, job training, education, health care or other pathways to recovery, [facility] is a great place to start.  Phone: [phone]. www.[employer] (Great listing of all types of recovery and non-recovery related resources)    Alcoholics Anonymous  Phone: 8-192-ALCOHOL  Website: HTTP://WWW.AA.ORG/  AA [place] (708-331-2325 or http://aaminneaFlubit Limitedis.org)  AA [place] (897-817-7048 or www.aastpaul.org)     Narcotics  Anonymous  Phone: 680.614.8054  Website: www.[employer]keep[employer].    [facility] [employer] [address], [place], [place],  Phone: [phone]  Drop-in Hours: Monday-Friday 9-11:30 am. By appointment at other times.  Provides: [facility] is a drop-in center on the [place] side of [place] that provides a safe space for individuals who are homeless and have a history of chemical use. Sobriety is not a requirement but drugs and alcohol are not allowed on the property.  Services: Non-clients can access drop-in services such as Recovery and Harm Reduction Groups, referrals to case management, community activities, shower facilities, and a pool table. Individuals who are homeless and have chemical health needs may be eligible for enrollment into [facility]'s case management program. Clients and  work together to access benefits, treatment, health care, shelter, and external housing resources.

## 2025-07-30 NOTE — ED NOTES
RN ED Mental Health Handoff Note    Voluntary    Does patient require 1:1? Yes    Hold and rights been given and documented for patient: N/A    Is the patient in BH scrubs? No -searched    Has the patient been searched? Yes    Is the 15 minute observation tool up to date? Yes    Was patient issued a welcome folder? Yes    Room check completed this shift: Yes    PSS3 and Baker Assessment/Reassessment this shift:    C-SSRS (Baker)      Date and Time Q1 Wished to be Dead (Past Month) Q2 Suicidal Thoughts (Past Month) Q3 Suicidal Thought Method Q4 Suicidal Intent without Specific Plan Q5 Suicide Intent with Specific Plan Q6 Suicide Behavior (Lifetime) If yes to Q6, within past 3 months? Level of Risk per Screen Level of Risk per Screen User   07/30/25 0217 1-->yes 1-->yes 0-->no 0-->no 0-->no -- 1-->yes -- high risk CF   07/29/25 1807 1-->yes 1-->yes -- -- -- 1-->yes 1-->yes -- high risk MKF            Behavioral status of patient: Green    Code 21 called this shift? No    Use of restraints/seclusion this shift? No    Most recent vital signs:  Temp: 97.5  F (36.4  C) Temp src: Temporal BP: 109/74 Pulse: 106   Resp: 18 SpO2: 96 % O2 Device: Nasal cannula Oxygen Delivery: 1 LPM    Medications:  Scheduled medication compliance? N/A    PRN Meds administered this shift? N/A    Medications   Lidocaine (LIDOCARE) 4 % Patch 1 patch (has no administration in time range)   Lidocaine (LIDOCARE) 4 % Patch 1 patch (1 patch Transdermal $Patch/Med Applied 7/29/25 4302)   OLANZapine zydis (zyPREXA) ODT tab 5 mg (has no administration in time range)   hydrOXYzine HCl (ATARAX) tablet 25 mg (has no administration in time range)   acetaminophen (TYLENOL) tablet 975 mg (has no administration in time range)         ADLs    Meal Provided this shift? N/A    Hygiene items provided? N/A    ADLs completed? N/A    Date of last shower: before admittion    Any significant events this shift? No    Any information that would be helpful in caring  for this patient?  N/a    Family present/updated? No    Location of patient's belongings: [facility]    Critical Care Minutes:  Does the patient need critical care minutes documented? No

## 2025-07-30 NOTE — PHARMACY-ADMISSION MEDICATION HISTORY
Pharmacist Admission Medication History    Admission medication history is complete. The information provided in this note is only as accurate as the sources available at the time of the update.    Information Source(s): Patient via in-person    Pertinent Information: outside meds    Changes made to PTA medication list:  Added: prolia 60mg inj  Deleted: neurontin  Changed: atarax    Allergies reviewed with patient and updates made in EHR: yes    Medication History Completed By: Pb Geronimo [facility] 7/30/2025 12:32 AM    PTA Med List   Medication Sig Last Dose/Taking    albuterol (PROAIR HFA/PROVENTIL HFA/VENTOLIN HFA) 108 (90 Base) MCG/ACT inhaler Inhale 1-2 puffs into the lungs every 6 hours as needed. Taking As Needed    denosumab (PROLIA) 60 mg/mL injection Inject 60 mg subcutaneously every 6 months. More than a month    hydrOXYzine HCl (ATARAX) 25 MG tablet Take 50 mg by mouth at bedtime. Past Week    mirtazapine (REMERON) 15 MG tablet Take 1 tablet (15 mg) by mouth at bedtime. Past Week    pravastatin (PRAVACHOL) 10 MG tablet Take 1 tablet by mouth daily. Past Week    rivaroxaban ANTICOAGULANT (XARELTO) 10 MG TABS tablet Take 10 mg by mouth daily (with dinner). Past Week    STIOLTO RESPIMAT 2.5-2.5 MCG/ACT AERS Inhale 2 puffs into the lungs daily. Past Week

## 2025-07-30 NOTE — PLAN OF CARE
Omar Adams  July 30, 2025  Plan of Care Hand-off Note     Patient Recommended Care Path: discharge    Clinical Substantiation:  Pt presents to the ED this evening for an evaluation due to concerns for alcohol use, recent fall, depression, anxiety and suicidal ideation. Pt reports a recent 3 day episode of heavy alcohol use in which he drank approximately between a pint and a fifth of alcohol daily ending on Friday. At the time of assessment, he requests resources to go to CD treatment. He reports he has been struggling with depression and anxiety symptoms. He feels this recent 3 day binge drinking episode was an unconscious suicide attempt because of how much he drank and felt he was close to death, though denies this was his plan or intent at the time. He denies any current SI. Denies HI, NSSI. He endorses hallucinations when going through withdrawal, which he reports has never happened for him before, but reports he has not experienced AH or VH for a few days now. He was agreeable to being scheduled through the navigation hub for a KAIA assessment and following up with CD treatment. He was also provided KAIA resources in his AVS. He has an established outpatient therapist he is also able to follow up with and has been in contact with. Pt requested to remain in the ED until morning for additional safety and support while he awaits follow up for KAIA assessment and CD treatment. Consulted attending MD, and will plan to observe pt overnight and have EC follow if there are additional needs noted in the AM, otherwise pt is able to discharge if ready and there are no other needs or safety concerns expressed.    Goals for crisis stabilization:  stabilization and reduction of symptoms    Next steps for Care Team:  Pt will remain in the ED overnight until the morning for monitoring. Will plan to follow up with [facility] for KAIA assessment and referrals to CD treatment. Pt will follow up with established outpatient  therapy as well until he is able to start treatment.    Treatment Objectives Addressed:  rapport building, orienting the patient to therapy, safety planning, assessing safety, identifying an appropriate aftercare plan, processing feelings    Therapeutic Interventions:  Engaged in safety planning    Has a specific means been identified for suicidal.homicide actions: No    Patient coping skills attempted to reduce the crisis:  Pt came to the ED for help. Reports he has supportive family and friends he can lean on, and he called his therapist.      Collateral contact information:  Brodie Adams, son, [phone]     Legal Status: Voluntary/Patient has signed consent for treatment                           Reviewed court records: yes     Psychiatry Consult:     MELANIE Sapp
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
## 2025-07-31 ENCOUNTER — TELEPHONE (OUTPATIENT)
Dept: BEHAVIORAL HEALTH | Facility: CLINIC | Age: 80
End: 2025-07-31
Payer: COMMERCIAL

## 2025-07-31 NOTE — TELEPHONE ENCOUNTER
Triage and Transition Services- Patient Follow Up Call  Service Line Making Phone Call: Extended Care    Who did Writer Talk to: Patient    Details of Call: Spoke with patient by phone, he is looking forward to starting with [facility] program. Attempted to connect to [facility], was unavailable but knows patient is trying to get in contact.     Tena Gant 7/31/2025 9:25 AM